# Patient Record
Sex: FEMALE | Race: WHITE | Employment: OTHER | URBAN - METROPOLITAN AREA
[De-identification: names, ages, dates, MRNs, and addresses within clinical notes are randomized per-mention and may not be internally consistent; named-entity substitution may affect disease eponyms.]

---

## 2017-01-16 ENCOUNTER — HOSPITAL ENCOUNTER (OUTPATIENT)
Dept: CT IMAGING | Age: 82
Discharge: HOME | End: 2017-01-16
Attending: INTERNAL MEDICINE
Payer: MEDICARE

## 2017-01-16 ENCOUNTER — LAB ENCOUNTER (OUTPATIENT)
Dept: LAB | Age: 82
End: 2017-01-16
Attending: INTERNAL MEDICINE
Payer: MEDICARE

## 2017-01-16 DIAGNOSIS — Z51.81 ENCOUNTER FOR THERAPEUTIC DRUG MONITORING: ICD-10-CM

## 2017-01-16 DIAGNOSIS — M05.79 RHEUMATOID ARTHRITIS INVOLVING MULTIPLE SITES WITH POSITIVE RHEUMATOID FACTOR (HCC): ICD-10-CM

## 2017-01-16 DIAGNOSIS — R91.1 NODULE OF LEFT LUNG: ICD-10-CM

## 2017-01-16 DIAGNOSIS — M32.9 SYSTEMIC LUPUS ERYTHEMATOSUS, UNSPECIFIED SLE TYPE, UNSPECIFIED ORGAN INVOLVEMENT STATUS (HCC): ICD-10-CM

## 2017-01-16 LAB
ALBUMIN SERPL BCP-MCNC: 3.3 G/DL (ref 3.5–4.8)
AST SERPL-CCNC: 31 U/L (ref 15–41)
BASOPHILS # BLD: 0 K/UL (ref 0–0.2)
BASOPHILS NFR BLD: 1 %
BILIRUB UR QL: NEGATIVE
COLOR UR: YELLOW
CREAT SERPL-MCNC: 1.07 MG/DL (ref 0.5–1.5)
CRP SERPL-MCNC: 0.8 MG/DL (ref 0–0.9)
EOSINOPHIL # BLD: 0.1 K/UL (ref 0–0.7)
EOSINOPHIL NFR BLD: 2 %
ERYTHROCYTE [DISTWIDTH] IN BLOOD BY AUTOMATED COUNT: 15.7 % (ref 11–15)
ERYTHROCYTE [SEDIMENTATION RATE] IN BLOOD: 87 MM/HR (ref 0–30)
GLUCOSE UR-MCNC: NEGATIVE MG/DL
HCT VFR BLD AUTO: 32.5 % (ref 35–48)
HGB BLD-MCNC: 10.7 G/DL (ref 12–16)
HGB UR QL STRIP.AUTO: NEGATIVE
LYMPHOCYTES # BLD: 0.9 K/UL (ref 1–4)
LYMPHOCYTES NFR BLD: 18 %
MCH RBC QN AUTO: 30.5 PG (ref 27–32)
MCHC RBC AUTO-ENTMCNC: 33.1 G/DL (ref 32–37)
MCV RBC AUTO: 92 FL (ref 80–100)
MONOCYTES # BLD: 0.4 K/UL (ref 0–1)
MONOCYTES NFR BLD: 9 %
NEUTROPHILS # BLD AUTO: 3.3 K/UL (ref 1.8–7.7)
NEUTROPHILS NFR BLD: 71 %
NITRITE UR QL STRIP.AUTO: NEGATIVE
PH UR: 5 [PH] (ref 5–8)
PLATELET # BLD AUTO: 246 K/UL (ref 140–400)
PMV BLD AUTO: 9.5 FL (ref 7.4–10.3)
PROT UR-MCNC: 30 MG/DL
RBC # BLD AUTO: 3.53 M/UL (ref 3.7–5.4)
RBC #/AREA URNS AUTO: 1 /HPF
SP GR UR STRIP: 1.03 (ref 1–1.03)
UROBILINOGEN UR STRIP-ACNC: <2
VIT C UR-MCNC: 40 MG/DL
WBC # BLD AUTO: 4.6 K/UL (ref 4–11)
WBC #/AREA URNS AUTO: 11 /HPF

## 2017-01-16 PROCEDURE — 84450 TRANSFERASE (AST) (SGOT): CPT

## 2017-01-16 PROCEDURE — 86140 C-REACTIVE PROTEIN: CPT

## 2017-01-16 PROCEDURE — 81001 URINALYSIS AUTO W/SCOPE: CPT

## 2017-01-16 PROCEDURE — 71250 CT THORAX DX C-: CPT

## 2017-01-16 PROCEDURE — 82040 ASSAY OF SERUM ALBUMIN: CPT

## 2017-01-16 PROCEDURE — 82565 ASSAY OF CREATININE: CPT

## 2017-01-16 PROCEDURE — 85025 COMPLETE CBC W/AUTO DIFF WBC: CPT

## 2017-01-16 PROCEDURE — 36415 COLL VENOUS BLD VENIPUNCTURE: CPT

## 2017-01-16 PROCEDURE — 85652 RBC SED RATE AUTOMATED: CPT

## 2017-01-25 ENCOUNTER — OFFICE VISIT (OUTPATIENT)
Dept: RHEUMATOLOGY | Facility: CLINIC | Age: 82
End: 2017-01-25

## 2017-01-25 VITALS
WEIGHT: 108.81 LBS | SYSTOLIC BLOOD PRESSURE: 125 MMHG | DIASTOLIC BLOOD PRESSURE: 71 MMHG | HEIGHT: 64.5 IN | BODY MASS INDEX: 18.35 KG/M2 | HEART RATE: 79 BPM

## 2017-01-25 DIAGNOSIS — R91.1 PULMONARY NODULE: ICD-10-CM

## 2017-01-25 DIAGNOSIS — Z51.81 ENCOUNTER FOR THERAPEUTIC DRUG MONITORING: ICD-10-CM

## 2017-01-25 DIAGNOSIS — M05.79 RHEUMATOID ARTHRITIS INVOLVING MULTIPLE SITES WITH POSITIVE RHEUMATOID FACTOR (HCC): Primary | ICD-10-CM

## 2017-01-25 DIAGNOSIS — K52.9 ENTERITIS: ICD-10-CM

## 2017-01-25 PROCEDURE — G0463 HOSPITAL OUTPT CLINIC VISIT: HCPCS | Performed by: INTERNAL MEDICINE

## 2017-01-25 PROCEDURE — 99214 OFFICE O/P EST MOD 30 MIN: CPT | Performed by: INTERNAL MEDICINE

## 2017-01-25 RX ORDER — METHOTREXATE 2.5 MG/1
TABLET ORAL
Qty: 30 TABLET | Refills: 2 | Status: SHIPPED | OUTPATIENT
Start: 2017-01-25 | End: 2017-08-28

## 2017-01-25 RX ORDER — BISMUTH SUBCITRATE POTASSIUM, METRONIDAZOLE, TETRACYCLINE HYDROCHLORIDE 140; 125; 125 MG/1; MG/1; MG/1
3 CAPSULE ORAL 4 TIMES DAILY
Refills: 0 | COMMUNITY
Start: 2017-01-20 | End: 2017-05-23

## 2017-01-25 NOTE — PROGRESS NOTES
HPI:    Patient ID: Tayler Rocha is a [de-identified]year old female. 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   She started prednisone 30 mg a day stiffness. Review of Systems   Constitutional: Positive for fatigue. Negative for fever, chills and diaphoresis. Respiratory: Negative for shortness of breath. Cardiovascular: Negative for chest pain.    Gastrointestinal: Positive for abdominal pa history of cigarette smoking many years ago. Dr. Sarai Luong may well want to refer her to pulmonary. Follow-up CT is recommended in 3 months. 3. Renal insufficiency. Chronic. 4. Anemia.  Some could be related to her renal insufficiency, and much is like

## 2017-02-24 ENCOUNTER — LAB ENCOUNTER (OUTPATIENT)
Dept: LAB | Age: 82
End: 2017-02-24
Attending: INTERNAL MEDICINE
Payer: MEDICARE

## 2017-02-24 DIAGNOSIS — Z51.81 ENCOUNTER FOR THERAPEUTIC DRUG MONITORING: ICD-10-CM

## 2017-02-24 DIAGNOSIS — M05.79 RHEUMATOID ARTHRITIS INVOLVING MULTIPLE SITES WITH POSITIVE RHEUMATOID FACTOR (HCC): ICD-10-CM

## 2017-02-24 LAB
ALBUMIN SERPL BCP-MCNC: 3.5 G/DL (ref 3.5–4.8)
AST SERPL-CCNC: 30 U/L (ref 15–41)
BASOPHILS # BLD: 0 K/UL (ref 0–0.2)
BASOPHILS NFR BLD: 0 %
CREAT SERPL-MCNC: 1.06 MG/DL (ref 0.5–1.5)
CRP SERPL-MCNC: 0.6 MG/DL (ref 0–0.9)
EOSINOPHIL # BLD: 0 K/UL (ref 0–0.7)
EOSINOPHIL NFR BLD: 0 %
ERYTHROCYTE [DISTWIDTH] IN BLOOD BY AUTOMATED COUNT: 18.3 % (ref 11–15)
ERYTHROCYTE [SEDIMENTATION RATE] IN BLOOD: 54 MM/HR (ref 0–30)
HCT VFR BLD AUTO: 33.1 % (ref 35–48)
HGB BLD-MCNC: 11 G/DL (ref 12–16)
LYMPHOCYTES # BLD: 0.8 K/UL (ref 1–4)
LYMPHOCYTES NFR BLD: 11 %
MCH RBC QN AUTO: 32 PG (ref 27–32)
MCHC RBC AUTO-ENTMCNC: 33.2 G/DL (ref 32–37)
MCV RBC AUTO: 96.4 FL (ref 80–100)
MONOCYTES # BLD: 0.3 K/UL (ref 0–1)
MONOCYTES NFR BLD: 5 %
NEUTROPHILS # BLD AUTO: 6 K/UL (ref 1.8–7.7)
NEUTROPHILS NFR BLD: 84 %
PLATELET # BLD AUTO: 232 K/UL (ref 140–400)
PMV BLD AUTO: 9.9 FL (ref 7.4–10.3)
RBC # BLD AUTO: 3.44 M/UL (ref 3.7–5.4)
WBC # BLD AUTO: 7.2 K/UL (ref 4–11)

## 2017-02-24 PROCEDURE — 86140 C-REACTIVE PROTEIN: CPT

## 2017-02-24 PROCEDURE — 85025 COMPLETE CBC W/AUTO DIFF WBC: CPT

## 2017-02-24 PROCEDURE — 84450 TRANSFERASE (AST) (SGOT): CPT

## 2017-02-24 PROCEDURE — 36415 COLL VENOUS BLD VENIPUNCTURE: CPT

## 2017-02-24 PROCEDURE — 85652 RBC SED RATE AUTOMATED: CPT

## 2017-02-24 PROCEDURE — 82565 ASSAY OF CREATININE: CPT

## 2017-02-24 PROCEDURE — 82040 ASSAY OF SERUM ALBUMIN: CPT

## 2017-03-01 ENCOUNTER — OFFICE VISIT (OUTPATIENT)
Dept: RHEUMATOLOGY | Facility: CLINIC | Age: 82
End: 2017-03-01

## 2017-03-01 VITALS
HEART RATE: 83 BPM | BODY MASS INDEX: 17.77 KG/M2 | DIASTOLIC BLOOD PRESSURE: 71 MMHG | SYSTOLIC BLOOD PRESSURE: 134 MMHG | WEIGHT: 105.38 LBS | HEIGHT: 64.5 IN

## 2017-03-01 DIAGNOSIS — Z51.81 ENCOUNTER FOR THERAPEUTIC DRUG MONITORING: ICD-10-CM

## 2017-03-01 DIAGNOSIS — K52.9 ENTERITIS: ICD-10-CM

## 2017-03-01 DIAGNOSIS — R91.1 PULMONARY NODULE: ICD-10-CM

## 2017-03-01 DIAGNOSIS — M05.79 RHEUMATOID ARTHRITIS INVOLVING MULTIPLE SITES WITH POSITIVE RHEUMATOID FACTOR (HCC): Primary | ICD-10-CM

## 2017-03-01 PROCEDURE — 99214 OFFICE O/P EST MOD 30 MIN: CPT | Performed by: INTERNAL MEDICINE

## 2017-03-01 PROCEDURE — G0463 HOSPITAL OUTPT CLINIC VISIT: HCPCS | Performed by: INTERNAL MEDICINE

## 2017-03-01 NOTE — PROGRESS NOTES
HPI:    Patient ID: León Cruz is a 81year old female. 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   She started prednisone 30 mg a day a in the distal esophagus. She saw Dr. Conchita Machuca, who will follow this up. Review of Systems   Constitutional: Positive for fatigue. Negative for fever, chills and diaphoresis. Respiratory: Negative for shortness of breath.     Cardiovascular: Nega thickening. She has a history of cigarette smoking many years ago. 3. Renal insufficiency. Chronic. Stable. 4. Anemia. Some could be related to her renal insufficiency. Improving as her RA comes under better control. 5. H Pylori.  Continued stomach sympt

## 2017-05-03 NOTE — TELEPHONE ENCOUNTER
LOV:3-1  Last Filled:  Labs:12-13, #90 with 0 refill  Future Appointments  Date Time Provider Mariah Rivera   5/23/2017 9:40 AM Lela Rick MD [facility] [place] [facility] [place]       Please Advise

## 2017-05-04 RX ORDER — PREDNISONE 1 MG/1
TABLET ORAL
Qty: 90 TABLET | Refills: 0 | OUTPATIENT
Start: 2017-05-04

## 2017-05-18 ENCOUNTER — APPOINTMENT (OUTPATIENT)
Dept: LAB | Age: 82
End: 2017-05-18
Attending: INTERNAL MEDICINE
Payer: MEDICARE

## 2017-05-19 ENCOUNTER — LAB ENCOUNTER (OUTPATIENT)
Dept: LAB | Age: 82
End: 2017-05-19
Attending: FAMILY MEDICINE
Payer: MEDICARE

## 2017-05-19 DIAGNOSIS — Z51.81 ENCOUNTER FOR THERAPEUTIC DRUG MONITORING: ICD-10-CM

## 2017-05-19 DIAGNOSIS — E78.5 HYPERLIPEMIA: Primary | ICD-10-CM

## 2017-05-19 DIAGNOSIS — M05.79 RHEUMATOID ARTHRITIS INVOLVING MULTIPLE SITES WITH POSITIVE RHEUMATOID FACTOR (HCC): ICD-10-CM

## 2017-05-19 PROCEDURE — 85652 RBC SED RATE AUTOMATED: CPT

## 2017-05-19 PROCEDURE — 80061 LIPID PANEL: CPT

## 2017-05-19 PROCEDURE — 80053 COMPREHEN METABOLIC PANEL: CPT

## 2017-05-19 PROCEDURE — 86140 C-REACTIVE PROTEIN: CPT

## 2017-05-19 PROCEDURE — 85025 COMPLETE CBC W/AUTO DIFF WBC: CPT

## 2017-05-19 PROCEDURE — 36415 COLL VENOUS BLD VENIPUNCTURE: CPT

## 2017-05-23 ENCOUNTER — OFFICE VISIT (OUTPATIENT)
Dept: RHEUMATOLOGY | Facility: CLINIC | Age: 82
End: 2017-05-23

## 2017-05-23 VITALS
WEIGHT: 107 LBS | SYSTOLIC BLOOD PRESSURE: 122 MMHG | HEIGHT: 64.5 IN | DIASTOLIC BLOOD PRESSURE: 69 MMHG | HEART RATE: 81 BPM | BODY MASS INDEX: 18.04 KG/M2

## 2017-05-23 DIAGNOSIS — Z51.81 ENCOUNTER FOR THERAPEUTIC DRUG MONITORING: ICD-10-CM

## 2017-05-23 DIAGNOSIS — M05.79 RHEUMATOID ARTHRITIS INVOLVING MULTIPLE SITES WITH POSITIVE RHEUMATOID FACTOR (HCC): Primary | ICD-10-CM

## 2017-05-23 PROCEDURE — G0463 HOSPITAL OUTPT CLINIC VISIT: HCPCS | Performed by: INTERNAL MEDICINE

## 2017-05-23 PROCEDURE — 99214 OFFICE O/P EST MOD 30 MIN: CPT | Performed by: INTERNAL MEDICINE

## 2017-05-23 RX ORDER — FOLIC ACID 1 MG/1
TABLET ORAL
Qty: 180 TABLET | Refills: 3 | Status: SHIPPED | OUTPATIENT
Start: 2017-05-23 | End: 2018-03-28

## 2017-05-23 NOTE — PROGRESS NOTES
HPI:    Patient ID: Amadou Mckenzie is a 81year old female. 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   She started prednisone 30 mg a day and t aspect of the superior segments of the lower lobes bilaterally. Infiltration of the anterior superior mediastinum. Review of Systems   Constitutional: Positive for fatigue. Negative for fever, chills and diaphoresis.    Respiratory: Negative f repeat monitoring tests. 2. Small pulmonary nodules, pleural thickening. She has a history of cigarette smoking many years ago. 3. Renal insufficiency. Chronic. Stable. 4. Anemia. Some could be related to her renal insufficiency. 5. H Pylori.  Continued

## 2017-07-07 ENCOUNTER — LAB ENCOUNTER (OUTPATIENT)
Dept: LAB | Age: 82
End: 2017-07-07
Attending: INTERNAL MEDICINE
Payer: MEDICARE

## 2017-07-07 DIAGNOSIS — Z51.81 ENCOUNTER FOR THERAPEUTIC DRUG MONITORING: ICD-10-CM

## 2017-07-07 DIAGNOSIS — M05.79 RHEUMATOID ARTHRITIS INVOLVING MULTIPLE SITES WITH POSITIVE RHEUMATOID FACTOR (HCC): ICD-10-CM

## 2017-07-07 LAB
ALBUMIN SERPL BCP-MCNC: 3.4 G/DL (ref 3.5–4.8)
AST SERPL-CCNC: 93 U/L (ref 15–41)
BASOPHILS # BLD: 0 K/UL (ref 0–0.2)
BASOPHILS NFR BLD: 0 %
CREAT SERPL-MCNC: 1.05 MG/DL (ref 0.5–1.5)
CRP SERPL-MCNC: 0.6 MG/DL (ref 0–0.9)
EOSINOPHIL # BLD: 0.1 K/UL (ref 0–0.7)
EOSINOPHIL NFR BLD: 3 %
ERYTHROCYTE [DISTWIDTH] IN BLOOD BY AUTOMATED COUNT: 15.4 % (ref 11–15)
ERYTHROCYTE [SEDIMENTATION RATE] IN BLOOD: 64 MM/HR (ref 0–30)
HCT VFR BLD AUTO: 29.7 % (ref 35–48)
HGB BLD-MCNC: 10.2 G/DL (ref 12–16)
LYMPHOCYTES # BLD: 0.7 K/UL (ref 1–4)
LYMPHOCYTES NFR BLD: 19 %
MCH RBC QN AUTO: 35.3 PG (ref 27–32)
MCHC RBC AUTO-ENTMCNC: 34.4 G/DL (ref 32–37)
MCV RBC AUTO: 102.6 FL (ref 80–100)
MONOCYTES # BLD: 0.1 K/UL (ref 0–1)
MONOCYTES NFR BLD: 4 %
NEUTROPHILS # BLD AUTO: 2.7 K/UL (ref 1.8–7.7)
NEUTROPHILS NFR BLD: 74 %
PLATELET # BLD AUTO: 171 K/UL (ref 140–400)
PMV BLD AUTO: 9.6 FL (ref 7.4–10.3)
RBC # BLD AUTO: 2.89 M/UL (ref 3.7–5.4)
WBC # BLD AUTO: 3.7 K/UL (ref 4–11)

## 2017-07-07 PROCEDURE — 85025 COMPLETE CBC W/AUTO DIFF WBC: CPT

## 2017-07-07 PROCEDURE — 36415 COLL VENOUS BLD VENIPUNCTURE: CPT

## 2017-07-07 PROCEDURE — 84450 TRANSFERASE (AST) (SGOT): CPT

## 2017-07-07 PROCEDURE — 86140 C-REACTIVE PROTEIN: CPT

## 2017-07-07 PROCEDURE — 82040 ASSAY OF SERUM ALBUMIN: CPT

## 2017-07-07 PROCEDURE — 85652 RBC SED RATE AUTOMATED: CPT

## 2017-07-07 PROCEDURE — 82565 ASSAY OF CREATININE: CPT

## 2017-07-11 ENCOUNTER — OFFICE VISIT (OUTPATIENT)
Dept: RHEUMATOLOGY | Facility: CLINIC | Age: 82
End: 2017-07-11

## 2017-07-11 VITALS
DIASTOLIC BLOOD PRESSURE: 61 MMHG | BODY MASS INDEX: 17.94 KG/M2 | HEIGHT: 64.5 IN | HEART RATE: 82 BPM | WEIGHT: 106.38 LBS | SYSTOLIC BLOOD PRESSURE: 106 MMHG

## 2017-07-11 DIAGNOSIS — Z51.81 ENCOUNTER FOR THERAPEUTIC DRUG MONITORING: ICD-10-CM

## 2017-07-11 DIAGNOSIS — R91.1 PULMONARY NODULE: ICD-10-CM

## 2017-07-11 DIAGNOSIS — M05.79 RHEUMATOID ARTHRITIS INVOLVING MULTIPLE SITES WITH POSITIVE RHEUMATOID FACTOR (HCC): Primary | ICD-10-CM

## 2017-07-11 DIAGNOSIS — R74.01 TRANSAMINASEMIA: ICD-10-CM

## 2017-07-11 PROCEDURE — G0463 HOSPITAL OUTPT CLINIC VISIT: HCPCS | Performed by: INTERNAL MEDICINE

## 2017-07-11 PROCEDURE — 99214 OFFICE O/P EST MOD 30 MIN: CPT | Performed by: INTERNAL MEDICINE

## 2017-07-11 NOTE — PROGRESS NOTES
HPI:    Patient ID: Yamile Ortiz is a 81year old female. 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   She started prednisone 30 mg a day and tapered to 5 aspect of the superior segments of the lower lobes bilaterally. Infiltration of the anterior superior mediastinum. She smoked more than 1 pack of day of cigarettes until giving him up 40 years ago.   She has chronic shortness of breath that comes and goes adenopathy. Neurological: She is alert and oriented to person, place, and time. Skin: No rash noted. Psychiatric: She has a normal mood and affect. ASSESSMENT/PLAN:     1. Severe CCP and RF positive rheumatoid arthritis.  She has improved

## 2017-08-28 NOTE — TELEPHONE ENCOUNTER
LOV: 7-11-17  Last Refilled:#30, 2rfs 1/25/17  Labs:AST 93 7/7/17      No future appointments. Please advise.

## 2017-09-25 ENCOUNTER — LAB ENCOUNTER (OUTPATIENT)
Dept: LAB | Age: 82
End: 2017-09-25
Attending: INTERNAL MEDICINE
Payer: MEDICARE

## 2017-09-25 DIAGNOSIS — Z51.81 ENCOUNTER FOR THERAPEUTIC DRUG MONITORING: ICD-10-CM

## 2017-09-25 DIAGNOSIS — M05.79 RHEUMATOID ARTHRITIS INVOLVING MULTIPLE SITES WITH POSITIVE RHEUMATOID FACTOR (HCC): ICD-10-CM

## 2017-09-25 LAB
ALBUMIN SERPL BCP-MCNC: 3.3 G/DL (ref 3.5–4.8)
AST SERPL-CCNC: 35 U/L (ref 15–41)
BASOPHILS # BLD: 0 K/UL (ref 0–0.2)
BASOPHILS NFR BLD: 1 %
CREAT SERPL-MCNC: 1.06 MG/DL (ref 0.5–1.5)
CRP SERPL-MCNC: 1 MG/DL (ref 0–0.9)
EOSINOPHIL # BLD: 0.1 K/UL (ref 0–0.7)
EOSINOPHIL NFR BLD: 2 %
ERYTHROCYTE [DISTWIDTH] IN BLOOD BY AUTOMATED COUNT: 14.5 % (ref 11–15)
ERYTHROCYTE [SEDIMENTATION RATE] IN BLOOD: 65 MM/HR (ref 0–30)
HCT VFR BLD AUTO: 29.6 % (ref 35–48)
HGB BLD-MCNC: 10.1 G/DL (ref 12–16)
LYMPHOCYTES # BLD: 0.6 K/UL (ref 1–4)
LYMPHOCYTES NFR BLD: 17 %
MCH RBC QN AUTO: 35.2 PG (ref 27–32)
MCHC RBC AUTO-ENTMCNC: 34.3 G/DL (ref 32–37)
MCV RBC AUTO: 102.7 FL (ref 80–100)
MONOCYTES # BLD: 0.4 K/UL (ref 0–1)
MONOCYTES NFR BLD: 11 %
NEUTROPHILS # BLD AUTO: 2.6 K/UL (ref 1.8–7.7)
NEUTROPHILS NFR BLD: 69 %
PLATELET # BLD AUTO: 170 K/UL (ref 140–400)
PMV BLD AUTO: 9.2 FL (ref 7.4–10.3)
RBC # BLD AUTO: 2.88 M/UL (ref 3.7–5.4)
WBC # BLD AUTO: 3.7 K/UL (ref 4–11)

## 2017-09-25 PROCEDURE — 82040 ASSAY OF SERUM ALBUMIN: CPT

## 2017-09-25 PROCEDURE — 36415 COLL VENOUS BLD VENIPUNCTURE: CPT

## 2017-09-25 PROCEDURE — 85652 RBC SED RATE AUTOMATED: CPT

## 2017-09-25 PROCEDURE — 82565 ASSAY OF CREATININE: CPT

## 2017-09-25 PROCEDURE — 84450 TRANSFERASE (AST) (SGOT): CPT

## 2017-09-25 PROCEDURE — 86140 C-REACTIVE PROTEIN: CPT

## 2017-09-25 PROCEDURE — 85025 COMPLETE CBC W/AUTO DIFF WBC: CPT

## 2017-09-26 NOTE — PROGRESS NOTES
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 She started prednisone 30 mg a day and tapered to 5 mg a day, which really helped originally.  She stopped prednisone the superior segments of the lower lobes bilaterally. Infiltration of the anterior superior mediastinum. She smoked more than 1 pack of day of cigarettes until giving him up 40 years ago.   She has chronic shortness of breath that comes and goes over the adenopathy. Neurological: She is alert and oriented to person, place, and time. Skin: No rash noted. Psychiatric: She has a normal mood and affect. ASSESSMENT/PLAN:     1. Severe CCP and RF positive rheumatoid arthritis.  She has improved

## 2017-09-27 ENCOUNTER — OFFICE VISIT (OUTPATIENT)
Dept: RHEUMATOLOGY | Facility: CLINIC | Age: 82
End: 2017-09-27

## 2017-09-27 VITALS
SYSTOLIC BLOOD PRESSURE: 112 MMHG | DIASTOLIC BLOOD PRESSURE: 64 MMHG | HEART RATE: 81 BPM | HEIGHT: 64 IN | BODY MASS INDEX: 18.75 KG/M2 | WEIGHT: 109.81 LBS

## 2017-09-27 DIAGNOSIS — M05.79 RHEUMATOID ARTHRITIS INVOLVING MULTIPLE SITES WITH POSITIVE RHEUMATOID FACTOR (HCC): Primary | ICD-10-CM

## 2017-09-27 DIAGNOSIS — R74.01 TRANSAMINASEMIA: ICD-10-CM

## 2017-09-27 DIAGNOSIS — R91.1 PULMONARY NODULE: ICD-10-CM

## 2017-09-27 DIAGNOSIS — Z51.81 ENCOUNTER FOR THERAPEUTIC DRUG MONITORING: ICD-10-CM

## 2017-09-27 PROCEDURE — G0463 HOSPITAL OUTPT CLINIC VISIT: HCPCS | Performed by: INTERNAL MEDICINE

## 2017-09-27 PROCEDURE — 99214 OFFICE O/P EST MOD 30 MIN: CPT | Performed by: INTERNAL MEDICINE

## 2017-12-22 ENCOUNTER — LAB ENCOUNTER (OUTPATIENT)
Dept: LAB | Age: 82
End: 2017-12-22
Attending: INTERNAL MEDICINE
Payer: MEDICARE

## 2017-12-22 DIAGNOSIS — M05.79 RHEUMATOID ARTHRITIS INVOLVING MULTIPLE SITES WITH POSITIVE RHEUMATOID FACTOR (HCC): ICD-10-CM

## 2017-12-22 DIAGNOSIS — Z51.81 ENCOUNTER FOR THERAPEUTIC DRUG MONITORING: ICD-10-CM

## 2017-12-22 PROCEDURE — 82565 ASSAY OF CREATININE: CPT

## 2017-12-22 PROCEDURE — 36415 COLL VENOUS BLD VENIPUNCTURE: CPT

## 2017-12-22 PROCEDURE — 82040 ASSAY OF SERUM ALBUMIN: CPT

## 2017-12-22 PROCEDURE — 85025 COMPLETE CBC W/AUTO DIFF WBC: CPT

## 2017-12-22 PROCEDURE — 85652 RBC SED RATE AUTOMATED: CPT

## 2017-12-22 PROCEDURE — 84450 TRANSFERASE (AST) (SGOT): CPT

## 2017-12-22 PROCEDURE — 86140 C-REACTIVE PROTEIN: CPT

## 2017-12-23 NOTE — PROGRESS NOTES
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 She started prednisone 30 mg a day and tapered to 5 mg a day, which really helped originally.  She stopped predn scarring which accounted for the density seen on chest x-ray. Small semisolid nodular density versus nodular atelectasis/scarring at the right medial base.   6 x 5 mm subpleural noncalcified nodular density right lower lobe, 3 mm noncalcified nodule left u Conjunctivae are normal.   Cardiovascular: Normal rate, regular rhythm and normal heart sounds. Pulmonary/Chest: Effort normal and breath sounds normal.   Abdominal: Soft. Bowel sounds are normal. She exhibits no mass. There is tenderness.  There is no r remain on the lower dose of methotrexate.

## 2017-12-27 ENCOUNTER — OFFICE VISIT (OUTPATIENT)
Dept: RHEUMATOLOGY | Facility: CLINIC | Age: 82
End: 2017-12-27

## 2017-12-27 VITALS
HEART RATE: 70 BPM | HEIGHT: 64 IN | SYSTOLIC BLOOD PRESSURE: 148 MMHG | BODY MASS INDEX: 20.32 KG/M2 | WEIGHT: 119 LBS | DIASTOLIC BLOOD PRESSURE: 73 MMHG

## 2017-12-27 DIAGNOSIS — R74.01 TRANSAMINASEMIA: ICD-10-CM

## 2017-12-27 DIAGNOSIS — M05.79 RHEUMATOID ARTHRITIS INVOLVING MULTIPLE SITES WITH POSITIVE RHEUMATOID FACTOR (HCC): Primary | ICD-10-CM

## 2017-12-27 DIAGNOSIS — Z51.81 ENCOUNTER FOR THERAPEUTIC DRUG MONITORING: ICD-10-CM

## 2017-12-27 DIAGNOSIS — D64.9 ANEMIA, UNSPECIFIED TYPE: ICD-10-CM

## 2017-12-27 DIAGNOSIS — R91.1 PULMONARY NODULE: ICD-10-CM

## 2017-12-27 PROCEDURE — G0463 HOSPITAL OUTPT CLINIC VISIT: HCPCS | Performed by: INTERNAL MEDICINE

## 2017-12-27 PROCEDURE — 99214 OFFICE O/P EST MOD 30 MIN: CPT | Performed by: INTERNAL MEDICINE

## 2018-03-02 ENCOUNTER — HOSPITAL (OUTPATIENT)
Dept: OTHER | Age: 83
End: 2018-03-02
Attending: EMERGENCY MEDICINE

## 2018-03-02 LAB
ANALYZER ANC (IANC): ABNORMAL
ANION GAP SERPL CALC-SCNC: 10 MMOL/L (ref 10–20)
BASOPHILS # BLD: 0 THOUSAND/MCL (ref 0–0.3)
BASOPHILS NFR BLD: 0 %
BUN SERPL-MCNC: 35 MG/DL (ref 6–20)
BUN/CREAT SERPL: 31 (ref 7–25)
CALCIUM SERPL-MCNC: 8.7 MG/DL (ref 8.4–10.2)
CHLORIDE: 102 MMOL/L (ref 98–107)
CO2 SERPL-SCNC: 28 MMOL/L (ref 21–32)
CREAT SERPL-MCNC: 1.12 MG/DL (ref 0.51–0.95)
DIFFERENTIAL METHOD BLD: ABNORMAL
EOSINOPHIL # BLD: 0.1 THOUSAND/MCL (ref 0.1–0.5)
EOSINOPHIL NFR BLD: 1 %
ERYTHROCYTE [DISTWIDTH] IN BLOOD: 14.1 % (ref 11–15)
GLUCOSE SERPL-MCNC: 88 MG/DL (ref 65–99)
HEMATOCRIT: 31.8 % (ref 36–46.5)
HGB BLD-MCNC: 10.5 GM/DL (ref 12–15.5)
LYMPHOCYTES # BLD: 2.1 THOUSAND/MCL (ref 1–4)
LYMPHOCYTES NFR BLD: 16 %
MCH RBC QN AUTO: 33.2 PG (ref 26–34)
MCHC RBC AUTO-ENTMCNC: 33 GM/DL (ref 32–36.5)
MCV RBC AUTO: 100.6 FL (ref 78–100)
MONOCYTES # BLD: 0.7 THOUSAND/MCL (ref 0.3–0.9)
MONOCYTES NFR BLD: 5 %
NEUTROPHILS # BLD: 10.4 THOUSAND/MCL (ref 1.8–7.7)
NEUTROPHILS NFR BLD: 78 %
NEUTS SEG NFR BLD: ABNORMAL %
PERCENT NRBC: ABNORMAL
PLATELET # BLD: 450 THOUSAND/MCL (ref 140–450)
POTASSIUM SERPL-SCNC: 4.9 MMOL/L (ref 3.4–5.1)
RBC # BLD: 3.16 MILLION/MCL (ref 4–5.2)
SODIUM SERPL-SCNC: 135 MMOL/L (ref 135–145)
WBC # BLD: 13.3 THOUSAND/MCL (ref 4.2–11)

## 2018-03-26 ENCOUNTER — LAB ENCOUNTER (OUTPATIENT)
Dept: LAB | Age: 83
End: 2018-03-26
Attending: INTERNAL MEDICINE
Payer: MEDICARE

## 2018-03-26 DIAGNOSIS — Z51.81 ENCOUNTER FOR THERAPEUTIC DRUG MONITORING: ICD-10-CM

## 2018-03-26 DIAGNOSIS — M05.79 RHEUMATOID ARTHRITIS INVOLVING MULTIPLE SITES WITH POSITIVE RHEUMATOID FACTOR (HCC): ICD-10-CM

## 2018-03-26 LAB
ALBUMIN SERPL BCP-MCNC: 2.9 G/DL (ref 3.5–4.8)
AST SERPL-CCNC: 31 U/L (ref 15–41)
BASOPHILS # BLD: 0 K/UL (ref 0–0.2)
BASOPHILS NFR BLD: 1 %
CREAT SERPL-MCNC: 1 MG/DL (ref 0.5–1.5)
CRP SERPL-MCNC: 1.4 MG/DL (ref 0–0.9)
EOSINOPHIL # BLD: 0 K/UL (ref 0–0.7)
EOSINOPHIL NFR BLD: 1 %
ERYTHROCYTE [DISTWIDTH] IN BLOOD BY AUTOMATED COUNT: 15.8 % (ref 11–15)
ERYTHROCYTE [SEDIMENTATION RATE] IN BLOOD: 59 MM/HR (ref 0–30)
HCT VFR BLD AUTO: 29.9 % (ref 35–48)
HGB BLD-MCNC: 10.2 G/DL (ref 12–16)
LYMPHOCYTES # BLD: 0.7 K/UL (ref 1–4)
LYMPHOCYTES NFR BLD: 18 %
MCH RBC QN AUTO: 34.2 PG (ref 27–32)
MCHC RBC AUTO-ENTMCNC: 34 G/DL (ref 32–37)
MCV RBC AUTO: 100.6 FL (ref 80–100)
MONOCYTES # BLD: 0.4 K/UL (ref 0–1)
MONOCYTES NFR BLD: 10 %
NEUTROPHILS # BLD AUTO: 2.9 K/UL (ref 1.8–7.7)
NEUTROPHILS NFR BLD: 71 %
PLATELET # BLD AUTO: 204 K/UL (ref 140–400)
PMV BLD AUTO: 8.7 FL (ref 7.4–10.3)
RBC # BLD AUTO: 2.97 M/UL (ref 3.7–5.4)
WBC # BLD AUTO: 4 K/UL (ref 4–11)

## 2018-03-26 PROCEDURE — 86140 C-REACTIVE PROTEIN: CPT

## 2018-03-26 PROCEDURE — 85652 RBC SED RATE AUTOMATED: CPT

## 2018-03-26 PROCEDURE — 82040 ASSAY OF SERUM ALBUMIN: CPT

## 2018-03-26 PROCEDURE — 36415 COLL VENOUS BLD VENIPUNCTURE: CPT

## 2018-03-26 PROCEDURE — 82565 ASSAY OF CREATININE: CPT

## 2018-03-26 PROCEDURE — 85025 COMPLETE CBC W/AUTO DIFF WBC: CPT

## 2018-03-26 PROCEDURE — 84450 TRANSFERASE (AST) (SGOT): CPT

## 2018-03-27 NOTE — PROGRESS NOTES
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 At first she was on prednisone which helped, but she stopped it on her own, and doesn't want to start it back.  Kaelyn Suarez Small semisolid nodular density versus nodular atelectasis/scarring at the right medial base. 6 x 5 mm subpleural noncalcified nodular density right lower lobe, 3 mm noncalcified nodule left upper lobe.   Additional nodularity and pleural thickening poster Abdominal: Soft. Bowel sounds are normal. She exhibits no mass. There is tenderness. There is no rebound and no guarding. Musculoskeletal: She exhibits no edema. There may be minimal soft tissue swelling in her wrists, hand MCP joints.   There is syno on the lower dose of methotrexate.

## 2018-03-28 ENCOUNTER — OFFICE VISIT (OUTPATIENT)
Dept: RHEUMATOLOGY | Facility: CLINIC | Age: 83
End: 2018-03-28

## 2018-03-28 VITALS
HEART RATE: 96 BPM | DIASTOLIC BLOOD PRESSURE: 65 MMHG | HEIGHT: 64 IN | WEIGHT: 104 LBS | SYSTOLIC BLOOD PRESSURE: 109 MMHG | BODY MASS INDEX: 17.75 KG/M2

## 2018-03-28 DIAGNOSIS — Z51.81 ENCOUNTER FOR THERAPEUTIC DRUG MONITORING: ICD-10-CM

## 2018-03-28 DIAGNOSIS — M05.79 RHEUMATOID ARTHRITIS INVOLVING MULTIPLE SITES WITH POSITIVE RHEUMATOID FACTOR (HCC): Primary | ICD-10-CM

## 2018-03-28 DIAGNOSIS — D63.8 ANEMIA OF CHRONIC DISEASE: ICD-10-CM

## 2018-03-28 PROCEDURE — 99214 OFFICE O/P EST MOD 30 MIN: CPT | Performed by: INTERNAL MEDICINE

## 2018-03-28 PROCEDURE — G0463 HOSPITAL OUTPT CLINIC VISIT: HCPCS | Performed by: INTERNAL MEDICINE

## 2018-03-28 RX ORDER — LEFLUNOMIDE 20 MG/1
TABLET ORAL
Qty: 30 TABLET | Refills: 2 | Status: SHIPPED | OUTPATIENT
Start: 2018-03-28 | End: 2018-06-06

## 2018-03-28 RX ORDER — FLUTICASONE PROPIONATE AND SALMETEROL 50; 250 UG/1; UG/1
1 POWDER RESPIRATORY (INHALATION) 2 TIMES DAILY
Refills: 0 | COMMUNITY
Start: 2018-03-07 | End: 2019-04-24 | Stop reason: ALTCHOICE

## 2018-04-30 ENCOUNTER — LAB ENCOUNTER (OUTPATIENT)
Dept: LAB | Age: 83
End: 2018-04-30
Attending: INTERNAL MEDICINE
Payer: MEDICARE

## 2018-04-30 DIAGNOSIS — Z51.81 ENCOUNTER FOR THERAPEUTIC DRUG MONITORING: ICD-10-CM

## 2018-04-30 DIAGNOSIS — M05.79 RHEUMATOID ARTHRITIS INVOLVING MULTIPLE SITES WITH POSITIVE RHEUMATOID FACTOR (HCC): ICD-10-CM

## 2018-04-30 PROCEDURE — 82565 ASSAY OF CREATININE: CPT

## 2018-04-30 PROCEDURE — 84450 TRANSFERASE (AST) (SGOT): CPT

## 2018-04-30 PROCEDURE — 82040 ASSAY OF SERUM ALBUMIN: CPT

## 2018-04-30 PROCEDURE — 85652 RBC SED RATE AUTOMATED: CPT

## 2018-04-30 PROCEDURE — 36415 COLL VENOUS BLD VENIPUNCTURE: CPT

## 2018-04-30 PROCEDURE — 86140 C-REACTIVE PROTEIN: CPT

## 2018-04-30 PROCEDURE — 85025 COMPLETE CBC W/AUTO DIFF WBC: CPT

## 2018-05-01 NOTE — PROGRESS NOTES
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 At first she was on prednisone which helped, but she stopped it on her own, and doesn't want to start it back.  [facility] upper lobe. Additional nodularity and pleural thickening posterior aspect of the superior segments of the lower lobes bilaterally. Infiltration of the anterior superior mediastinum.   She smoked more than 1 pack of day of cigarettes until giving him up 40 MCP joints. There is synovitis of her right thumb MCP joint.  strength is good. Shoulders and hips move freely. There are 3 what appear to be rheumatoid nodules on her right thumb finger pad and 1 just distal to her right third finger PIP joint.   L

## 2018-05-02 ENCOUNTER — TELEPHONE (OUTPATIENT)
Dept: RHEUMATOLOGY | Facility: CLINIC | Age: 83
End: 2018-05-02

## 2018-05-02 ENCOUNTER — APPOINTMENT (OUTPATIENT)
Dept: LAB | Age: 83
End: 2018-05-02
Attending: INTERNAL MEDICINE
Payer: MEDICARE

## 2018-05-02 ENCOUNTER — OFFICE VISIT (OUTPATIENT)
Dept: RHEUMATOLOGY | Facility: CLINIC | Age: 83
End: 2018-05-02

## 2018-05-02 VITALS
HEART RATE: 98 BPM | DIASTOLIC BLOOD PRESSURE: 72 MMHG | SYSTOLIC BLOOD PRESSURE: 123 MMHG | WEIGHT: 102.81 LBS | BODY MASS INDEX: 17.55 KG/M2 | HEIGHT: 64 IN

## 2018-05-02 DIAGNOSIS — M05.79 RHEUMATOID ARTHRITIS INVOLVING MULTIPLE SITES WITH POSITIVE RHEUMATOID FACTOR (HCC): ICD-10-CM

## 2018-05-02 DIAGNOSIS — M05.79 RHEUMATOID ARTHRITIS INVOLVING MULTIPLE SITES WITH POSITIVE RHEUMATOID FACTOR (HCC): Primary | ICD-10-CM

## 2018-05-02 DIAGNOSIS — Z51.81 ENCOUNTER FOR THERAPEUTIC DRUG MONITORING: ICD-10-CM

## 2018-05-02 DIAGNOSIS — D63.8 ANEMIA OF CHRONIC DISEASE: ICD-10-CM

## 2018-05-02 PROCEDURE — 86704 HEP B CORE ANTIBODY TOTAL: CPT

## 2018-05-02 PROCEDURE — 86803 HEPATITIS C AB TEST: CPT

## 2018-05-02 PROCEDURE — 86706 HEP B SURFACE ANTIBODY: CPT

## 2018-05-02 PROCEDURE — 87340 HEPATITIS B SURFACE AG IA: CPT

## 2018-05-02 PROCEDURE — 36415 COLL VENOUS BLD VENIPUNCTURE: CPT

## 2018-05-02 PROCEDURE — 80500 HEPATITIS B PROFILE: CPT

## 2018-05-02 PROCEDURE — 99214 OFFICE O/P EST MOD 30 MIN: CPT | Performed by: INTERNAL MEDICINE

## 2018-05-02 PROCEDURE — 86480 TB TEST CELL IMMUN MEASURE: CPT

## 2018-05-02 PROCEDURE — G0463 HOSPITAL OUTPT CLINIC VISIT: HCPCS | Performed by: INTERNAL MEDICINE

## 2018-05-02 RX ORDER — FOLIC ACID 1 MG/1
1 TABLET ORAL DAILY
Refills: 3 | COMMUNITY
Start: 2018-03-29 | End: 2019-04-24

## 2018-05-06 ENCOUNTER — TELEPHONE (OUTPATIENT)
Dept: RHEUMATOLOGY | Facility: CLINIC | Age: 83
End: 2018-05-06

## 2018-05-06 NOTE — TELEPHONE ENCOUNTER
Please let her know that her TB, HepB and HepC tests are negative    As long as her f/u chest CT is OK, she is ready to start [place]. Thanks.

## 2018-05-08 NOTE — TELEPHONE ENCOUNTER
Pt aware of lab results. Pt uncertain of CT results. Office will call [address] for CT results.

## 2018-05-09 NOTE — TELEPHONE ENCOUNTER
I received a copy of Princess's chest CT scan that was done on April 16th, 2018 at [place] [id number], ordered by Dr. Katelynn Sellers, her pulmonologist.  She has areas of pulmonary consolidation predominantly in the upper and middle lobes, pleural-based densit

## 2018-05-14 ENCOUNTER — TELEPHONE (OUTPATIENT)
Dept: RHEUMATOLOGY | Facility: CLINIC | Age: 83
End: 2018-05-14

## 2018-05-14 NOTE — TELEPHONE ENCOUNTER
Left message for pt to call Miguel Ángel Blake at [phone] for deliver of 1 free month of Tina Dyer. Call office back with questions.

## 2018-05-14 NOTE — TELEPHONE ENCOUNTER
[address] calling to inform Dr. Lawyer Cisneros that they have been unable to contact pt to delivery medication.  Please advise

## 2018-05-15 ENCOUNTER — MED REC SCAN ONLY (OUTPATIENT)
Dept: RHEUMATOLOGY | Facility: CLINIC | Age: 83
End: 2018-05-15

## 2018-06-04 ENCOUNTER — LAB ENCOUNTER (OUTPATIENT)
Dept: LAB | Age: 83
End: 2018-06-04
Attending: INTERNAL MEDICINE
Payer: MEDICARE

## 2018-06-04 DIAGNOSIS — M05.79 RHEUMATOID ARTHRITIS INVOLVING MULTIPLE SITES WITH POSITIVE RHEUMATOID FACTOR (HCC): ICD-10-CM

## 2018-06-04 DIAGNOSIS — Z51.81 ENCOUNTER FOR THERAPEUTIC DRUG MONITORING: ICD-10-CM

## 2018-06-04 PROCEDURE — 82565 ASSAY OF CREATININE: CPT

## 2018-06-04 PROCEDURE — 84450 TRANSFERASE (AST) (SGOT): CPT

## 2018-06-04 PROCEDURE — 86140 C-REACTIVE PROTEIN: CPT

## 2018-06-04 PROCEDURE — 85652 RBC SED RATE AUTOMATED: CPT

## 2018-06-04 PROCEDURE — 85025 COMPLETE CBC W/AUTO DIFF WBC: CPT

## 2018-06-04 PROCEDURE — 36415 COLL VENOUS BLD VENIPUNCTURE: CPT

## 2018-06-04 PROCEDURE — 82040 ASSAY OF SERUM ALBUMIN: CPT

## 2018-06-05 NOTE — PROGRESS NOTES
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 At first, she was on prednisone which helped, but she stopped it on her own, and doesn't want to start it back.  Since next week. She has seen pulmonary. She smoked more than 1 pack of day of cigarettes until giving him up 40 years ago. She has chronic shortness of breath that comes and goes over the last year.  Lately she she to have more fatigue, with shortness of jose pad and 1 just distal to her right third finger PIP joint. Lymphadenopathy:     She has no cervical adenopathy. Neurological: She is alert and oriented to person, place, and time. Skin: No rash noted. Psychiatric: She has a normal mood and affect.

## 2018-06-06 ENCOUNTER — OFFICE VISIT (OUTPATIENT)
Dept: RHEUMATOLOGY | Facility: CLINIC | Age: 83
End: 2018-06-06

## 2018-06-06 VITALS
SYSTOLIC BLOOD PRESSURE: 134 MMHG | BODY MASS INDEX: 17.42 KG/M2 | WEIGHT: 102 LBS | HEIGHT: 64 IN | HEART RATE: 90 BPM | DIASTOLIC BLOOD PRESSURE: 72 MMHG

## 2018-06-06 DIAGNOSIS — R91.1 PULMONARY NODULE: ICD-10-CM

## 2018-06-06 DIAGNOSIS — Z51.81 ENCOUNTER FOR THERAPEUTIC DRUG MONITORING: ICD-10-CM

## 2018-06-06 DIAGNOSIS — D63.8 ANEMIA OF CHRONIC DISEASE: ICD-10-CM

## 2018-06-06 DIAGNOSIS — M05.79 RHEUMATOID ARTHRITIS INVOLVING MULTIPLE SITES WITH POSITIVE RHEUMATOID FACTOR (HCC): Primary | ICD-10-CM

## 2018-06-06 PROCEDURE — 99214 OFFICE O/P EST MOD 30 MIN: CPT | Performed by: INTERNAL MEDICINE

## 2018-06-06 PROCEDURE — G0463 HOSPITAL OUTPT CLINIC VISIT: HCPCS | Performed by: INTERNAL MEDICINE

## 2018-06-06 RX ORDER — LEVOCETIRIZINE DIHYDROCHLORIDE 5 MG/1
5 TABLET, FILM COATED ORAL DAILY
Refills: 0 | COMMUNITY
Start: 2018-05-18 | End: 2019-04-24 | Stop reason: ALTCHOICE

## 2018-06-06 RX ORDER — LEFLUNOMIDE 20 MG/1
TABLET ORAL
Qty: 30 TABLET | Refills: 2 | Status: SHIPPED | OUTPATIENT
Start: 2018-06-06 | End: 2019-04-24

## 2018-06-22 RX ORDER — FOLIC ACID 1 MG/1
TABLET ORAL
Qty: 180 TABLET | Refills: 3 | Status: SHIPPED | OUTPATIENT
Start: 2018-06-22 | End: 2019-06-14

## 2018-10-22 NOTE — PROGRESS NOTES
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 At first, she was on prednisone which helped, but she stopped it on her own, and doesn't want to start it back.  Since bilaterally. Borderline to mildly enlarged lymph nodes in the mediastinum. Thyroid nodules and calcifications. A PET scan was done months ago, and she states was negative for malignancy.  She smoked more than 1 pack of day of cigarettes until giving him Neurological: She is alert and oriented to person, place, and time. Skin: No rash noted. Psychiatric: She has a normal mood and affect. ASSESSMENT/PLAN:     1. Severe CCP and RF positive rheumatoid arthritis.   She stopped leflunomide 20 mg a day

## 2018-10-23 ENCOUNTER — TELEPHONE (OUTPATIENT)
Dept: RHEUMATOLOGY | Facility: CLINIC | Age: 83
End: 2018-10-23

## 2018-10-23 ENCOUNTER — LAB ENCOUNTER (OUTPATIENT)
Dept: LAB | Age: 83
End: 2018-10-23
Attending: INTERNAL MEDICINE
Payer: MEDICARE

## 2018-10-23 DIAGNOSIS — M05.79 RHEUMATOID ARTHRITIS INVOLVING MULTIPLE SITES WITH POSITIVE RHEUMATOID FACTOR (HCC): Primary | ICD-10-CM

## 2018-10-23 DIAGNOSIS — Z51.81 ENCOUNTER FOR THERAPEUTIC DRUG MONITORING: ICD-10-CM

## 2018-10-23 DIAGNOSIS — M05.79 RHEUMATOID ARTHRITIS INVOLVING MULTIPLE SITES WITH POSITIVE RHEUMATOID FACTOR (HCC): ICD-10-CM

## 2018-10-23 PROCEDURE — 85025 COMPLETE CBC W/AUTO DIFF WBC: CPT

## 2018-10-23 PROCEDURE — 86140 C-REACTIVE PROTEIN: CPT

## 2018-10-23 PROCEDURE — 84450 TRANSFERASE (AST) (SGOT): CPT

## 2018-10-23 PROCEDURE — 85652 RBC SED RATE AUTOMATED: CPT

## 2018-10-23 PROCEDURE — 36415 COLL VENOUS BLD VENIPUNCTURE: CPT

## 2018-10-23 PROCEDURE — 82040 ASSAY OF SERUM ALBUMIN: CPT

## 2018-10-23 PROCEDURE — 82565 ASSAY OF CREATININE: CPT

## 2018-10-24 ENCOUNTER — OFFICE VISIT (OUTPATIENT)
Dept: RHEUMATOLOGY | Facility: CLINIC | Age: 83
End: 2018-10-24
Payer: MEDICARE

## 2018-10-24 ENCOUNTER — TELEPHONE (OUTPATIENT)
Dept: RHEUMATOLOGY | Facility: CLINIC | Age: 83
End: 2018-10-24

## 2018-10-24 VITALS
HEIGHT: 64 IN | DIASTOLIC BLOOD PRESSURE: 69 MMHG | HEART RATE: 76 BPM | SYSTOLIC BLOOD PRESSURE: 164 MMHG | WEIGHT: 101.81 LBS | BODY MASS INDEX: 17.38 KG/M2

## 2018-10-24 DIAGNOSIS — R91.1 PULMONARY NODULE: ICD-10-CM

## 2018-10-24 DIAGNOSIS — Z51.81 ENCOUNTER FOR THERAPEUTIC DRUG MONITORING: ICD-10-CM

## 2018-10-24 DIAGNOSIS — D63.8 ANEMIA OF CHRONIC DISEASE: ICD-10-CM

## 2018-10-24 DIAGNOSIS — M05.79 RHEUMATOID ARTHRITIS INVOLVING MULTIPLE SITES WITH POSITIVE RHEUMATOID FACTOR (HCC): Primary | ICD-10-CM

## 2018-10-24 PROCEDURE — 99214 OFFICE O/P EST MOD 30 MIN: CPT | Performed by: INTERNAL MEDICINE

## 2018-10-24 PROCEDURE — G0463 HOSPITAL OUTPT CLINIC VISIT: HCPCS | Performed by: INTERNAL MEDICINE

## 2018-11-01 NOTE — TELEPHONE ENCOUNTER
Contacted [place] to f/u on BI. Benefits verification summary to be faxed to [facility] office.

## 2018-11-02 NOTE — TELEPHONE ENCOUNTER
Contacted insurance at [phone] transferred to [phone]. Insurance to fax office 2 forms to [phone].

## 2018-11-07 NOTE — TELEPHONE ENCOUNTER
PA denied for [email or web address] [place]. Pt has to try and fail both Humira and  Enbrel and 2 of the following medications Remicade, Rituxan, [place], or Kineret. Please advise.

## 2018-11-12 NOTE — TELEPHONE ENCOUNTER
I spoke to Janet Brooks. I have a copy of her CT report from October 24th, 2018.   She has persistent tree-in-bud nodular opacifications predominantly in the right middle lobe and lingula, and lesser extent in other lobes, with mild bronchiectasis and bronchiol

## 2018-11-15 ENCOUNTER — LAB ENCOUNTER (OUTPATIENT)
Dept: LAB | Age: 83
End: 2018-11-15
Attending: INTERNAL MEDICINE
Payer: MEDICARE

## 2018-11-15 DIAGNOSIS — R91.8 LUNG MASS: Primary | ICD-10-CM

## 2018-11-15 PROCEDURE — 87205 SMEAR GRAM STAIN: CPT

## 2018-12-19 ENCOUNTER — TELEPHONE (OUTPATIENT)
Dept: RHEUMATOLOGY | Facility: CLINIC | Age: 83
End: 2018-12-19

## 2018-12-19 RX ORDER — PREDNISONE 1 MG/1
TABLET ORAL
Qty: 90 TABLET | Refills: 1 | Status: SHIPPED | OUTPATIENT
Start: 2018-12-19 | End: 2019-06-05

## 2018-12-19 NOTE — TELEPHONE ENCOUNTER
Her inflammatory arthritis has flared. She agreed to start prednisone 5 mg every morning. I will send in the prescription. She has not yet produced good phlegm samples for pulmonary diagnosis.   I encouraged her to call her pulmonologist.  Sherry Green

## 2018-12-19 NOTE — TELEPHONE ENCOUNTER
Pt called in stating that she is experiencing a lot of pain in her hands and knees due to an RA flare up. Pt stated that she is not currently taking any meds for RA, but would like to start taking a medication to assist with the pain.    Please advise

## 2018-12-19 NOTE — TELEPHONE ENCOUNTER
Called the patient to triage. Patient c/o Bilateral pain in her hands and knees that started about 1 week ago and has gotten progressively worse. States there is some swelling in bilateral hands, no swelling in the knees. No redness.  Patient is not taking

## 2019-04-22 ENCOUNTER — LAB ENCOUNTER (OUTPATIENT)
Dept: LAB | Age: 84
End: 2019-04-22
Attending: INTERNAL MEDICINE
Payer: MEDICARE

## 2019-04-22 DIAGNOSIS — Z51.81 ENCOUNTER FOR THERAPEUTIC DRUG MONITORING: ICD-10-CM

## 2019-04-22 DIAGNOSIS — R63.4 UNINTENDED WEIGHT LOSS: Primary | ICD-10-CM

## 2019-04-22 DIAGNOSIS — M05.79 RHEUMATOID ARTHRITIS INVOLVING MULTIPLE SITES WITH POSITIVE RHEUMATOID FACTOR (HCC): ICD-10-CM

## 2019-04-22 PROCEDURE — 84443 ASSAY THYROID STIM HORMONE: CPT

## 2019-04-22 PROCEDURE — 86140 C-REACTIVE PROTEIN: CPT

## 2019-04-22 PROCEDURE — 85025 COMPLETE CBC W/AUTO DIFF WBC: CPT

## 2019-04-22 PROCEDURE — 36415 COLL VENOUS BLD VENIPUNCTURE: CPT

## 2019-04-22 PROCEDURE — 85652 RBC SED RATE AUTOMATED: CPT

## 2019-04-22 PROCEDURE — 80053 COMPREHEN METABOLIC PANEL: CPT

## 2019-04-23 NOTE — PROGRESS NOTES
Belem Davis is a delightful 28-year-old woman, who I last saw October 24th of 2018, with CCP and RF positive rheumatoid arthritis. Prednisone has helped. She remains on 5mg daily. She stopped leflunomide because of hair thinning. 20mg daily 4 weeks.  O started. Review of Systems   Constitutional: Positive for fatigue. Negative for chills and fever. Respiratory: Positive for shortness of breath. Cardiovascular: Negative for chest pain. Gastrointestinal: Positive for abdominal pain.    Skin: Negat Chronic. Stay hydrated. 4. Anemia. Renal insufficiency and active RA. Less severe. 5. H/o H Pylori. Continued stomach symptoms. 6.  Hair thinning. She will continue biotin 5 mg a day over-the-counter. Leflunomide discontinued.   7.  Unresolved lung pat

## 2019-04-24 ENCOUNTER — OFFICE VISIT (OUTPATIENT)
Dept: RHEUMATOLOGY | Facility: CLINIC | Age: 84
End: 2019-04-24
Payer: MEDICARE

## 2019-04-24 VITALS
SYSTOLIC BLOOD PRESSURE: 153 MMHG | HEIGHT: 64 IN | WEIGHT: 104 LBS | HEART RATE: 71 BPM | BODY MASS INDEX: 17.75 KG/M2 | DIASTOLIC BLOOD PRESSURE: 74 MMHG

## 2019-04-24 DIAGNOSIS — D63.8 ANEMIA OF CHRONIC DISEASE: ICD-10-CM

## 2019-04-24 DIAGNOSIS — R91.1 PULMONARY NODULE: ICD-10-CM

## 2019-04-24 DIAGNOSIS — M05.79 RHEUMATOID ARTHRITIS INVOLVING MULTIPLE SITES WITH POSITIVE RHEUMATOID FACTOR (HCC): Primary | ICD-10-CM

## 2019-04-24 DIAGNOSIS — Z51.81 ENCOUNTER FOR THERAPEUTIC DRUG MONITORING: ICD-10-CM

## 2019-04-24 PROCEDURE — 99214 OFFICE O/P EST MOD 30 MIN: CPT | Performed by: INTERNAL MEDICINE

## 2019-04-24 PROCEDURE — G0463 HOSPITAL OUTPT CLINIC VISIT: HCPCS | Performed by: INTERNAL MEDICINE

## 2019-04-24 RX ORDER — SULFASALAZINE 500 MG/1
TABLET ORAL
Qty: 60 TABLET | Refills: 2 | Status: SHIPPED | OUTPATIENT
Start: 2019-04-24 | End: 2019-05-21

## 2019-05-22 RX ORDER — SULFASALAZINE 500 MG/1
TABLET ORAL
Qty: 60 TABLET | Refills: 2 | Status: SHIPPED | OUTPATIENT
Start: 2019-05-22 | End: 2019-06-05

## 2019-05-22 NOTE — TELEPHONE ENCOUNTER
Last refilled: #60 tab with 2 refills  LOV:   Future Appointments   Date Time Provider Mariah Rivera   6/5/2019  2:30 PM Alyssia Espinosa MD [facility] [place] [facility] [place]      ASSESSMENT/PLAN:      1. CCP and RF positive rheumatoid arthritis. Active.   Many Monocytes %      % 9.5   Eosinophils %      % 1.9   Basophils %      % 0.5   Immature Granulocyte %      % 0.3   SED RATE      0 - 30 mm/Hr 40 (H)   C-REACTIVE PROTEIN      <0.30 mg/dL 0.69 (H)       Please advise.

## 2019-06-04 ENCOUNTER — LAB ENCOUNTER (OUTPATIENT)
Dept: LAB | Age: 84
End: 2019-06-04
Attending: INTERNAL MEDICINE
Payer: MEDICARE

## 2019-06-04 DIAGNOSIS — Z51.81 ENCOUNTER FOR THERAPEUTIC DRUG MONITORING: ICD-10-CM

## 2019-06-04 DIAGNOSIS — M05.79 RHEUMATOID ARTHRITIS INVOLVING MULTIPLE SITES WITH POSITIVE RHEUMATOID FACTOR (HCC): ICD-10-CM

## 2019-06-04 PROCEDURE — 82040 ASSAY OF SERUM ALBUMIN: CPT

## 2019-06-04 PROCEDURE — 36415 COLL VENOUS BLD VENIPUNCTURE: CPT

## 2019-06-04 PROCEDURE — 84450 TRANSFERASE (AST) (SGOT): CPT

## 2019-06-04 PROCEDURE — 86140 C-REACTIVE PROTEIN: CPT

## 2019-06-04 PROCEDURE — 82565 ASSAY OF CREATININE: CPT

## 2019-06-04 PROCEDURE — 85652 RBC SED RATE AUTOMATED: CPT

## 2019-06-04 PROCEDURE — 85025 COMPLETE CBC W/AUTO DIFF WBC: CPT

## 2019-06-04 NOTE — PROGRESS NOTES
Meaghan Wilkerson is a delightful 43-year-old woman, who I last saw April 24th of 2019, with CCP and RF positive rheumatoid arthritis. Prednisone has helped. She misunderstood, and stopped 5mg daily after I last saw her.  Her RA has flared, with more pain, s definite diagnosis. Therefore, Robbie Valles was not started. Review of Systems   Constitutional: Positive for fatigue. Negative for chills and fever. Respiratory: Positive for shortness of breath. Cardiovascular: Negative for chest pain.    Gastrointest symptoms. 6.  Hair thinning. She will continue biotin 5 mg a day over-the-counter. Leflunomide discontinued. 7.  Unresolved lung pathology. Her symptoms are stable. She refused bronchoscopy for diagnosis. History of cigarette smoking.

## 2019-06-05 ENCOUNTER — OFFICE VISIT (OUTPATIENT)
Dept: RHEUMATOLOGY | Facility: CLINIC | Age: 84
End: 2019-06-05
Payer: MEDICARE

## 2019-06-05 VITALS
HEIGHT: 64 IN | BODY MASS INDEX: 18.27 KG/M2 | DIASTOLIC BLOOD PRESSURE: 67 MMHG | HEART RATE: 79 BPM | SYSTOLIC BLOOD PRESSURE: 122 MMHG | WEIGHT: 107 LBS

## 2019-06-05 DIAGNOSIS — Z51.81 ENCOUNTER FOR THERAPEUTIC DRUG MONITORING: ICD-10-CM

## 2019-06-05 DIAGNOSIS — D63.8 ANEMIA OF CHRONIC DISEASE: ICD-10-CM

## 2019-06-05 DIAGNOSIS — M05.79 RHEUMATOID ARTHRITIS INVOLVING MULTIPLE SITES WITH POSITIVE RHEUMATOID FACTOR (HCC): Primary | ICD-10-CM

## 2019-06-05 PROCEDURE — 99214 OFFICE O/P EST MOD 30 MIN: CPT | Performed by: INTERNAL MEDICINE

## 2019-06-05 PROCEDURE — G0463 HOSPITAL OUTPT CLINIC VISIT: HCPCS | Performed by: INTERNAL MEDICINE

## 2019-06-05 RX ORDER — SULFASALAZINE 500 MG/1
TABLET ORAL
Qty: 120 TABLET | Refills: 2 | Status: SHIPPED | OUTPATIENT
Start: 2019-06-05 | End: 2019-09-17

## 2019-06-05 RX ORDER — PYRIDOXINE HCL (VITAMIN B6) 100 MG
500 TABLET ORAL DAILY
COMMUNITY

## 2019-06-05 RX ORDER — PREDNISONE 1 MG/1
TABLET ORAL
Qty: 90 TABLET | Refills: 1 | Status: SHIPPED | OUTPATIENT
Start: 2019-06-05 | End: 2019-09-06

## 2019-06-14 RX ORDER — FOLIC ACID 1 MG/1
2 TABLET ORAL
Qty: 180 TABLET | Refills: 3 | Status: SHIPPED | OUTPATIENT
Start: 2019-06-14 | End: 2020-03-09

## 2019-09-06 RX ORDER — PREDNISONE 1 MG/1
TABLET ORAL
Qty: 90 TABLET | Refills: 1 | Status: SHIPPED | OUTPATIENT
Start: 2019-09-06 | End: 2020-03-09

## 2019-09-06 NOTE — TELEPHONE ENCOUNTER
Last filled: 6/5/19 #90 tab with 1 refill  LOV: 6/5/19   Future Appointments   Date Time Provider Mariah Rivera   9/17/2019  2:30 PM Clara Merchant MD [facility] [place] [facility] [place]     Labs:  6/4/19     ASSESSMENT/PLAN:      1.  CCP and RF positive rheumat

## 2019-09-15 NOTE — PROGRESS NOTES
Keiry Hanks is a delightful 78-year-old woman, who I last saw June 5th of 2019, with CCP and RF positive rheumatoid arthritis. Since, she restarted prednisone 5mg daily, and her RA is again much improved.  Without prednisone, her RA has flared, with mo Radha Ream was not started. Review of Systems   Constitutional: Positive for fatigue. Negative for chills and fever. Respiratory: Positive for shortness of breath. Cardiovascular: Negative for chest pain.    Gastrointestinal: Positive for abdominal slime discontinued. 7.  Unresolved lung pathology. Her symptoms are stable. She refused bronchoscopy for diagnosis. History of cigarette smoking.

## 2019-09-16 ENCOUNTER — LAB ENCOUNTER (OUTPATIENT)
Dept: LAB | Age: 84
End: 2019-09-16
Attending: INTERNAL MEDICINE
Payer: MEDICARE

## 2019-09-16 DIAGNOSIS — M05.79 RHEUMATOID ARTHRITIS INVOLVING MULTIPLE SITES WITH POSITIVE RHEUMATOID FACTOR (HCC): ICD-10-CM

## 2019-09-16 DIAGNOSIS — Z51.81 ENCOUNTER FOR THERAPEUTIC DRUG MONITORING: ICD-10-CM

## 2019-09-16 LAB
ALBUMIN SERPL-MCNC: 3.5 G/DL (ref 3.4–5)
AST SERPL-CCNC: 31 U/L (ref 15–37)
BASOPHILS # BLD AUTO: 0.02 X10(3) UL (ref 0–0.2)
BASOPHILS NFR BLD AUTO: 0.4 %
CREAT BLD-MCNC: 1.16 MG/DL (ref 0.55–1.02)
CRP SERPL-MCNC: 0.98 MG/DL (ref ?–0.3)
DEPRECATED RDW RBC AUTO: 49.1 FL (ref 35.1–46.3)
EOSINOPHIL # BLD AUTO: 0.08 X10(3) UL (ref 0–0.7)
EOSINOPHIL NFR BLD AUTO: 1.6 %
ERYTHROCYTE [DISTWIDTH] IN BLOOD BY AUTOMATED COUNT: 13.2 % (ref 11–15)
ERYTHROCYTE [SEDIMENTATION RATE] IN BLOOD: 20 MM/HR (ref 0–30)
HCT VFR BLD AUTO: 37.3 % (ref 35–48)
HGB BLD-MCNC: 12.3 G/DL (ref 12–16)
IMM GRANULOCYTES # BLD AUTO: 0.01 X10(3) UL (ref 0–1)
IMM GRANULOCYTES NFR BLD: 0.2 %
LYMPHOCYTES # BLD AUTO: 0.94 X10(3) UL (ref 1–4)
LYMPHOCYTES NFR BLD AUTO: 18.7 %
MCH RBC QN AUTO: 32.9 PG (ref 26–34)
MCHC RBC AUTO-ENTMCNC: 33 G/DL (ref 31–37)
MCV RBC AUTO: 99.7 FL (ref 80–100)
MONOCYTES # BLD AUTO: 0.52 X10(3) UL (ref 0.1–1)
MONOCYTES NFR BLD AUTO: 10.3 %
NEUTROPHILS # BLD AUTO: 3.47 X10 (3) UL (ref 1.5–7.7)
NEUTROPHILS # BLD AUTO: 3.47 X10(3) UL (ref 1.5–7.7)
NEUTROPHILS NFR BLD AUTO: 68.8 %
PLATELET # BLD AUTO: 216 10(3)UL (ref 150–450)
RBC # BLD AUTO: 3.74 X10(6)UL (ref 3.8–5.3)
WBC # BLD AUTO: 5 X10(3) UL (ref 4–11)

## 2019-09-16 PROCEDURE — 36415 COLL VENOUS BLD VENIPUNCTURE: CPT

## 2019-09-16 PROCEDURE — 82040 ASSAY OF SERUM ALBUMIN: CPT

## 2019-09-16 PROCEDURE — 86140 C-REACTIVE PROTEIN: CPT

## 2019-09-16 PROCEDURE — 85652 RBC SED RATE AUTOMATED: CPT

## 2019-09-16 PROCEDURE — 84450 TRANSFERASE (AST) (SGOT): CPT

## 2019-09-16 PROCEDURE — 85025 COMPLETE CBC W/AUTO DIFF WBC: CPT

## 2019-09-16 PROCEDURE — 82565 ASSAY OF CREATININE: CPT

## 2019-09-17 ENCOUNTER — OFFICE VISIT (OUTPATIENT)
Dept: RHEUMATOLOGY | Facility: CLINIC | Age: 84
End: 2019-09-17
Payer: MEDICARE

## 2019-09-17 VITALS
WEIGHT: 107 LBS | SYSTOLIC BLOOD PRESSURE: 109 MMHG | HEIGHT: 64 IN | HEART RATE: 86 BPM | BODY MASS INDEX: 18.27 KG/M2 | DIASTOLIC BLOOD PRESSURE: 63 MMHG

## 2019-09-17 DIAGNOSIS — M05.79 RHEUMATOID ARTHRITIS INVOLVING MULTIPLE SITES WITH POSITIVE RHEUMATOID FACTOR (HCC): Primary | ICD-10-CM

## 2019-09-17 DIAGNOSIS — Z51.81 ENCOUNTER FOR THERAPEUTIC DRUG MONITORING: ICD-10-CM

## 2019-09-17 PROCEDURE — G0463 HOSPITAL OUTPT CLINIC VISIT: HCPCS | Performed by: INTERNAL MEDICINE

## 2019-09-17 PROCEDURE — 99214 OFFICE O/P EST MOD 30 MIN: CPT | Performed by: INTERNAL MEDICINE

## 2019-09-17 RX ORDER — SULFASALAZINE 500 MG/1
TABLET ORAL
Qty: 540 TABLET | Refills: 1 | Status: SHIPPED | OUTPATIENT
Start: 2019-09-17 | End: 2020-04-03

## 2019-12-02 ENCOUNTER — LAB ENCOUNTER (OUTPATIENT)
Dept: LAB | Age: 84
End: 2019-12-02
Attending: INTERNAL MEDICINE
Payer: MEDICARE

## 2019-12-02 DIAGNOSIS — R63.4 UNINTENDED WEIGHT LOSS: ICD-10-CM

## 2019-12-02 PROCEDURE — 80053 COMPREHEN METABOLIC PANEL: CPT

## 2019-12-02 PROCEDURE — 85652 RBC SED RATE AUTOMATED: CPT

## 2019-12-02 PROCEDURE — 85025 COMPLETE CBC W/AUTO DIFF WBC: CPT

## 2019-12-02 PROCEDURE — 86140 C-REACTIVE PROTEIN: CPT

## 2019-12-02 PROCEDURE — 36415 COLL VENOUS BLD VENIPUNCTURE: CPT

## 2019-12-02 PROCEDURE — 84443 ASSAY THYROID STIM HORMONE: CPT

## 2019-12-03 NOTE — PROGRESS NOTES
Geni Douglas is a delightful 52-year-old woman, who I last saw September 17th of 2019, with CCP and RF positive rheumatoid arthritis. Since, she has continued prednisone 5mg daily. Her sulfasalazine was increased to 1500 mg twice a day.   Her RA remain She refused bronchoscopy, so there is no definite diagnosis. Therefore, Eino Speed was not started. Review of Systems   Constitutional: Positive for fatigue. Negative for chills and fever. Respiratory: Negative for shortness of breath.     Cardiovascular inflammation markers are again more elevated suggesting some other process. Her TSH is mildly elevated so she may have hypothyroidism. She complains of fatigue. 6.  Hair thinning. She will continue biotin 5 mg a day over-the-counter.  Leflunomide discon

## 2019-12-04 ENCOUNTER — OFFICE VISIT (OUTPATIENT)
Dept: RHEUMATOLOGY | Facility: CLINIC | Age: 84
End: 2019-12-04
Payer: MEDICARE

## 2019-12-04 VITALS
BODY MASS INDEX: 17.93 KG/M2 | SYSTOLIC BLOOD PRESSURE: 133 MMHG | WEIGHT: 105 LBS | DIASTOLIC BLOOD PRESSURE: 63 MMHG | HEIGHT: 64 IN | HEART RATE: 91 BPM

## 2019-12-04 DIAGNOSIS — R10.84 GENERALIZED ABDOMINAL PAIN: ICD-10-CM

## 2019-12-04 DIAGNOSIS — Z51.81 ENCOUNTER FOR THERAPEUTIC DRUG MONITORING: ICD-10-CM

## 2019-12-04 DIAGNOSIS — M05.79 RHEUMATOID ARTHRITIS INVOLVING MULTIPLE SITES WITH POSITIVE RHEUMATOID FACTOR (HCC): Primary | ICD-10-CM

## 2019-12-04 PROCEDURE — G0463 HOSPITAL OUTPT CLINIC VISIT: HCPCS | Performed by: INTERNAL MEDICINE

## 2019-12-04 PROCEDURE — 99214 OFFICE O/P EST MOD 30 MIN: CPT | Performed by: INTERNAL MEDICINE

## 2020-03-09 RX ORDER — FOLIC ACID 1 MG/1
TABLET ORAL
Qty: 180 TABLET | Refills: 3 | Status: SHIPPED | OUTPATIENT
Start: 2020-03-09 | End: 2021-03-03

## 2020-03-09 RX ORDER — PREDNISONE 1 MG/1
TABLET ORAL
Qty: 90 TABLET | Refills: 1 | Status: SHIPPED | OUTPATIENT
Start: 2020-03-09 | End: 2020-09-04

## 2020-03-09 NOTE — TELEPHONE ENCOUNTER
FOLIC ACID  Last filled: 6/14/19 #180 tab with 3 refills     Prednisone  Last filled: 9/6/19 #90 tab with 1 refill    LOV: 12/4/19  Future Appointments   Date Time Provider Mariah Rivera   3/25/2020  2:50 PM Mary Vargas MD [facility] [place] [facility] [place]

## 2020-03-23 ENCOUNTER — TELEPHONE (OUTPATIENT)
Dept: RHEUMATOLOGY | Facility: CLINIC | Age: 85
End: 2020-03-23

## 2020-03-23 ENCOUNTER — LAB ENCOUNTER (OUTPATIENT)
Dept: LAB | Age: 85
End: 2020-03-23
Attending: INTERNAL MEDICINE
Payer: MEDICARE

## 2020-03-23 DIAGNOSIS — Z51.81 ENCOUNTER FOR THERAPEUTIC DRUG MONITORING: ICD-10-CM

## 2020-03-23 DIAGNOSIS — M05.79 RHEUMATOID ARTHRITIS INVOLVING MULTIPLE SITES WITH POSITIVE RHEUMATOID FACTOR (HCC): ICD-10-CM

## 2020-03-23 LAB
ALBUMIN SERPL-MCNC: 3.3 G/DL (ref 3.4–5)
AST SERPL-CCNC: 23 U/L (ref 15–37)
BASOPHILS # BLD AUTO: 0.02 X10(3) UL (ref 0–0.2)
BASOPHILS NFR BLD AUTO: 0.4 %
CREAT BLD-MCNC: 1.07 MG/DL (ref 0.55–1.02)
CRP SERPL-MCNC: 0.72 MG/DL (ref ?–0.3)
DEPRECATED RDW RBC AUTO: 49.6 FL (ref 35.1–46.3)
EOSINOPHIL # BLD AUTO: 0.11 X10(3) UL (ref 0–0.7)
EOSINOPHIL NFR BLD AUTO: 2.3 %
ERYTHROCYTE [DISTWIDTH] IN BLOOD BY AUTOMATED COUNT: 13.2 % (ref 11–15)
ERYTHROCYTE [SEDIMENTATION RATE] IN BLOOD: 50 MM/HR (ref 0–30)
HCT VFR BLD AUTO: 33.1 % (ref 35–48)
HGB BLD-MCNC: 10.8 G/DL (ref 12–16)
IMM GRANULOCYTES # BLD AUTO: 0.01 X10(3) UL (ref 0–1)
IMM GRANULOCYTES NFR BLD: 0.2 %
LYMPHOCYTES # BLD AUTO: 1.12 X10(3) UL (ref 1–4)
LYMPHOCYTES NFR BLD AUTO: 23 %
MCH RBC QN AUTO: 33.5 PG (ref 26–34)
MCHC RBC AUTO-ENTMCNC: 32.6 G/DL (ref 31–37)
MCV RBC AUTO: 102.8 FL (ref 80–100)
MONOCYTES # BLD AUTO: 0.56 X10(3) UL (ref 0.1–1)
MONOCYTES NFR BLD AUTO: 11.5 %
NEUTROPHILS # BLD AUTO: 3.06 X10 (3) UL (ref 1.5–7.7)
NEUTROPHILS # BLD AUTO: 3.06 X10(3) UL (ref 1.5–7.7)
NEUTROPHILS NFR BLD AUTO: 62.6 %
PLATELET # BLD AUTO: 201 10(3)UL (ref 150–450)
RBC # BLD AUTO: 3.22 X10(6)UL (ref 3.8–5.3)
WBC # BLD AUTO: 4.9 X10(3) UL (ref 4–11)

## 2020-03-23 PROCEDURE — 36415 COLL VENOUS BLD VENIPUNCTURE: CPT

## 2020-03-23 PROCEDURE — 86140 C-REACTIVE PROTEIN: CPT

## 2020-03-23 PROCEDURE — 82040 ASSAY OF SERUM ALBUMIN: CPT

## 2020-03-23 PROCEDURE — 82565 ASSAY OF CREATININE: CPT

## 2020-03-23 PROCEDURE — 85652 RBC SED RATE AUTOMATED: CPT

## 2020-03-23 PROCEDURE — 85025 COMPLETE CBC W/AUTO DIFF WBC: CPT

## 2020-03-23 PROCEDURE — 84450 TRANSFERASE (AST) (SGOT): CPT

## 2020-03-23 NOTE — TELEPHONE ENCOUNTER
Patient consents to telephone visit. Informed of charge after five minutes of talking with MD.    Patient had labs completed today and would like to discuss results at appt time on Wednesday.

## 2020-03-25 ENCOUNTER — TELEPHONE (OUTPATIENT)
Dept: RHEUMATOLOGY | Facility: CLINIC | Age: 85
End: 2020-03-25

## 2020-03-25 PROCEDURE — G2012 BRIEF CHECK IN BY MD/QHP: HCPCS | Performed by: INTERNAL MEDICINE

## 2020-03-25 NOTE — TELEPHONE ENCOUNTER
Virtual/Telephone Check-In    Gregorio Donohue verbally consents to a Virtual/Telephone Check-In service on 03/25/20. Patient understands and accepts financial responsibility for any deductible, co-insurance and/or co-pays associated with this service. She continues to follow with her pulmonologist.  She has areas of pulmonary consolidation predominantly in the upper and middle lobes. There was a pleural-based density in the anterior left upper lung field, making empyema difficult to exclude.   There wer

## 2020-04-03 RX ORDER — SULFASALAZINE 500 MG/1
TABLET ORAL
Qty: 540 TABLET | Refills: 1 | Status: SHIPPED | OUTPATIENT
Start: 2020-04-03 | End: 2020-10-09

## 2020-04-03 NOTE — TELEPHONE ENCOUNTER
Requested Prescriptions     Pending Prescriptions Disp Refills   • sulfaSALAzine 500 MG Oral Tab 540 tablet 1     Sig: TAKE 3 TABLETS BY MOUTH TWICE DAILY     Last filled: 9/17/19 #540 tab w/ 1 rf   LOV: 12/4/19   No future appointments.   Labs:   Component I gave her a copy of all of her lab results. Her inflammation markers are again more elevated suggesting some other process. Her TSH is mildly elevated so she may have hypothyroidism. She complains of fatigue. 6.  Hair thinning.   She will continue biot

## 2020-07-31 NOTE — PROGRESS NOTES
Drew Collier is a delightful 81-year-old woman, who I last saw December 4th of 2019, with CCP and RF positive rheumatoid arthritis. Since, she has continued prednisone 5mg daily, and sulfasalazine 1500 mg twice a day. Her RA remains much improved.  She Therefore, Nickola Basil was not started. Review of Systems   Constitutional: Positive for fatigue. Negative for chills and fever. Respiratory: Negative for shortness of breath. Cardiovascular: Negative for chest pain.    Gastrointestinal: Positive for oc discontinued. 6.  Unresolved lung pathology. Her symptoms are stable. She refused bronchoscopy for diagnosis. History of cigarette smoking. 7.  Diffuse rash from sun exposure 2 weeks ago. This is the first time that this has occurred.   She has been on

## 2020-08-03 ENCOUNTER — LAB ENCOUNTER (OUTPATIENT)
Dept: LAB | Age: 85
End: 2020-08-03
Attending: INTERNAL MEDICINE
Payer: MEDICARE

## 2020-08-03 DIAGNOSIS — M05.79 RHEUMATOID ARTHRITIS INVOLVING MULTIPLE SITES WITH POSITIVE RHEUMATOID FACTOR (HCC): ICD-10-CM

## 2020-08-03 DIAGNOSIS — Z51.81 ENCOUNTER FOR THERAPEUTIC DRUG MONITORING: ICD-10-CM

## 2020-08-03 LAB
ALBUMIN SERPL-MCNC: 3.4 G/DL (ref 3.4–5)
AST SERPL-CCNC: 32 U/L (ref 15–37)
BASOPHILS # BLD AUTO: 0.01 X10(3) UL (ref 0–0.2)
BASOPHILS NFR BLD AUTO: 0.2 %
CREAT BLD-MCNC: 1.07 MG/DL (ref 0.55–1.02)
CRP SERPL-MCNC: <0.29 MG/DL (ref ?–0.3)
DEPRECATED RDW RBC AUTO: 48.3 FL (ref 35.1–46.3)
EOSINOPHIL # BLD AUTO: 0.11 X10(3) UL (ref 0–0.7)
EOSINOPHIL NFR BLD AUTO: 2.4 %
ERYTHROCYTE [DISTWIDTH] IN BLOOD BY AUTOMATED COUNT: 12.8 % (ref 11–15)
ERYTHROCYTE [SEDIMENTATION RATE] IN BLOOD: 27 MM/HR (ref 0–30)
HCT VFR BLD AUTO: 34.9 % (ref 35–48)
HGB BLD-MCNC: 11.7 G/DL (ref 12–16)
IMM GRANULOCYTES # BLD AUTO: 0.01 X10(3) UL (ref 0–1)
IMM GRANULOCYTES NFR BLD: 0.2 %
LYMPHOCYTES # BLD AUTO: 1.1 X10(3) UL (ref 1–4)
LYMPHOCYTES NFR BLD AUTO: 23.9 %
MCH RBC QN AUTO: 34.4 PG (ref 26–34)
MCHC RBC AUTO-ENTMCNC: 33.5 G/DL (ref 31–37)
MCV RBC AUTO: 102.6 FL (ref 80–100)
MONOCYTES # BLD AUTO: 0.57 X10(3) UL (ref 0.1–1)
MONOCYTES NFR BLD AUTO: 12.4 %
NEUTROPHILS # BLD AUTO: 2.8 X10 (3) UL (ref 1.5–7.7)
NEUTROPHILS # BLD AUTO: 2.8 X10(3) UL (ref 1.5–7.7)
NEUTROPHILS NFR BLD AUTO: 60.9 %
PLATELET # BLD AUTO: 188 10(3)UL (ref 150–450)
RBC # BLD AUTO: 3.4 X10(6)UL (ref 3.8–5.3)
WBC # BLD AUTO: 4.6 X10(3) UL (ref 4–11)

## 2020-08-03 PROCEDURE — 86140 C-REACTIVE PROTEIN: CPT

## 2020-08-03 PROCEDURE — 82565 ASSAY OF CREATININE: CPT

## 2020-08-03 PROCEDURE — 84450 TRANSFERASE (AST) (SGOT): CPT

## 2020-08-03 PROCEDURE — 85025 COMPLETE CBC W/AUTO DIFF WBC: CPT

## 2020-08-03 PROCEDURE — 82040 ASSAY OF SERUM ALBUMIN: CPT

## 2020-08-03 PROCEDURE — 85652 RBC SED RATE AUTOMATED: CPT

## 2020-08-03 PROCEDURE — 36415 COLL VENOUS BLD VENIPUNCTURE: CPT

## 2020-08-05 ENCOUNTER — OFFICE VISIT (OUTPATIENT)
Dept: RHEUMATOLOGY | Facility: CLINIC | Age: 85
End: 2020-08-05
Payer: MEDICARE

## 2020-08-05 VITALS
SYSTOLIC BLOOD PRESSURE: 122 MMHG | HEART RATE: 89 BPM | DIASTOLIC BLOOD PRESSURE: 65 MMHG | HEIGHT: 64 IN | WEIGHT: 102 LBS | BODY MASS INDEX: 17.42 KG/M2

## 2020-08-05 DIAGNOSIS — R21 RASH: ICD-10-CM

## 2020-08-05 DIAGNOSIS — Z51.81 ENCOUNTER FOR THERAPEUTIC DRUG MONITORING: ICD-10-CM

## 2020-08-05 DIAGNOSIS — M05.79 RHEUMATOID ARTHRITIS INVOLVING MULTIPLE SITES WITH POSITIVE RHEUMATOID FACTOR (HCC): Primary | ICD-10-CM

## 2020-08-05 PROCEDURE — 99214 OFFICE O/P EST MOD 30 MIN: CPT | Performed by: INTERNAL MEDICINE

## 2020-08-05 PROCEDURE — G0463 HOSPITAL OUTPT CLINIC VISIT: HCPCS | Performed by: INTERNAL MEDICINE

## 2020-09-04 RX ORDER — PREDNISONE 1 MG/1
5 TABLET ORAL DAILY
Qty: 90 TABLET | Refills: 1 | Status: SHIPPED | OUTPATIENT
Start: 2020-09-04 | End: 2021-03-03

## 2020-09-04 NOTE — TELEPHONE ENCOUNTER
Fax received requesting refill, with 90 day supply.      Current Outpatient Medications   Medication Sig Dispense Refill   • PREDNISONE 5 MG Oral Tab TAKE 1 TABLET BY MOUTH DAILY 90 tablet 1

## 2020-09-04 NOTE — TELEPHONE ENCOUNTER
Last filled: 3/9/2020 #90 tab with 1 refill  LOV: 8/5/2020   No future appointments. Labs:   ASSESSMENT/PLAN:      1. CCP and RF positive rheumatoid arthritis. Doing well. She will continue prednisone 5 mg a day, and sulfasalazine 1500mg twice daily.  She

## 2020-10-09 RX ORDER — SULFASALAZINE 500 MG/1
TABLET ORAL
Qty: 540 TABLET | Refills: 1 | Status: SHIPPED | OUTPATIENT
Start: 2020-10-09 | End: 2021-04-06

## 2020-10-09 NOTE — TELEPHONE ENCOUNTER
Last filled:4/3/2020 #540 tab with 1 refill   LOV: 8/5/2020   No future appointments. Labs:   ASSESSMENT/PLAN:      1. CCP and RF positive rheumatoid arthritis.  Doing well. She will continue prednisone 5 mg a day, and sulfasalazine 1500mg twice daily.  Sh

## 2020-10-21 ENCOUNTER — WALK IN (OUTPATIENT)
Dept: URGENT CARE | Age: 85
End: 2020-10-21

## 2020-10-21 VITALS — TEMPERATURE: 98.6 F

## 2020-10-21 DIAGNOSIS — Z23 NEED FOR PROPHYLACTIC VACCINATION AND INOCULATION AGAINST INFLUENZA: Primary | ICD-10-CM

## 2020-10-21 PROCEDURE — G0008 ADMIN INFLUENZA VIRUS VAC: HCPCS | Performed by: NURSE PRACTITIONER

## 2020-10-21 PROCEDURE — 90662 IIV NO PRSV INCREASED AG IM: CPT | Performed by: NURSE PRACTITIONER

## 2021-03-03 RX ORDER — FOLIC ACID 1 MG/1
2 TABLET ORAL DAILY
Qty: 180 TABLET | Refills: 3 | Status: SHIPPED | OUTPATIENT
Start: 2021-03-03 | End: 2022-01-18

## 2021-03-03 RX ORDER — PREDNISONE 1 MG/1
5 TABLET ORAL DAILY
Qty: 90 TABLET | Refills: 1 | Status: SHIPPED | OUTPATIENT
Start: 2021-03-03 | End: 2021-04-06

## 2021-03-03 NOTE — TELEPHONE ENCOUNTER
Refill request on the following. Current Outpatient Medications   Medication Sig Dispense Refill   •       • predniSONE 5 MG Oral Tab Take 1 tablet (5 mg total) by mouth daily.  90 tablet 1   •       • FOLIC ACID 1 MG Oral Tab TAKE 2 TABLETS(2 MG) BY THONG

## 2021-03-03 NOTE — TELEPHONE ENCOUNTER
LOV: 8/5/2020  No future appointments.   Labs:   Component      Latest Ref Rng & Units 8/3/2020   WBC      4.0 - 11.0 x10(3) uL 4.6   RBC      3.80 - 5.30 x10(6)uL 3.40 (L)   Hemoglobin      12.0 - 16.0 g/dL 11.7 (L)   Hematocrit      35.0 - 48.0 % 34.9 (L)

## 2021-03-29 ENCOUNTER — TELEPHONE (OUTPATIENT)
Dept: RHEUMATOLOGY | Facility: CLINIC | Age: 86
End: 2021-03-29

## 2021-03-29 DIAGNOSIS — Z51.81 ENCOUNTER FOR THERAPEUTIC DRUG MONITORING: ICD-10-CM

## 2021-03-29 DIAGNOSIS — M05.79 RHEUMATOID ARTHRITIS INVOLVING MULTIPLE SITES WITH POSITIVE RHEUMATOID FACTOR (HCC): Primary | ICD-10-CM

## 2021-03-29 NOTE — TELEPHONE ENCOUNTER
Let patient know orders are now in system, she rescheduled appt with Dr Maia Bocanegra in [facility] on 4/6/21 at 2:10pm.

## 2021-03-29 NOTE — TELEPHONE ENCOUNTER
Patient came to [facility] to explain she went downstairs to get lab work done but her order was . She explained she had an appt tomorrow with Dr. Zoraida Salguero but I couldn't promise the order would have been done by today so patient canceled and would like for us to call her when orders are in so she can make another appt.

## 2021-04-04 NOTE — PROGRESS NOTES
Parul Lugo is a delightful 78-year-old woman, who I last saw August 5th of 2020, with CCP and RF positive rheumatoid arthritis. Since, she has continued prednisone 5mg daily, and sulfasalazine 1500 mg twice a day. Her RA remains much improved.  She d shortness of breath. Cardiovascular: Negative for chest pain. Gastrointestinal: Positive for occasional abdominal pain and nausea. Skin: Negative for rash. Hematological: Negative for adenopathy.      Allergies:No Known Allergies     PHYSICAL EXAM: smoking.

## 2021-04-05 ENCOUNTER — LAB ENCOUNTER (OUTPATIENT)
Dept: LAB | Age: 86
End: 2021-04-05
Attending: INTERNAL MEDICINE
Payer: MEDICARE

## 2021-04-05 DIAGNOSIS — M05.79 RHEUMATOID ARTHRITIS INVOLVING MULTIPLE SITES WITH POSITIVE RHEUMATOID FACTOR (HCC): ICD-10-CM

## 2021-04-05 DIAGNOSIS — Z51.81 ENCOUNTER FOR THERAPEUTIC DRUG MONITORING: ICD-10-CM

## 2021-04-05 PROCEDURE — 84450 TRANSFERASE (AST) (SGOT): CPT

## 2021-04-05 PROCEDURE — 86140 C-REACTIVE PROTEIN: CPT

## 2021-04-05 PROCEDURE — 82565 ASSAY OF CREATININE: CPT

## 2021-04-05 PROCEDURE — 36415 COLL VENOUS BLD VENIPUNCTURE: CPT

## 2021-04-05 PROCEDURE — 85025 COMPLETE CBC W/AUTO DIFF WBC: CPT

## 2021-04-05 PROCEDURE — 82040 ASSAY OF SERUM ALBUMIN: CPT

## 2021-04-05 PROCEDURE — 85652 RBC SED RATE AUTOMATED: CPT

## 2021-04-06 ENCOUNTER — OFFICE VISIT (OUTPATIENT)
Dept: RHEUMATOLOGY | Facility: CLINIC | Age: 86
End: 2021-04-06
Payer: MEDICARE

## 2021-04-06 VITALS
SYSTOLIC BLOOD PRESSURE: 141 MMHG | HEART RATE: 79 BPM | DIASTOLIC BLOOD PRESSURE: 91 MMHG | BODY MASS INDEX: 18.27 KG/M2 | HEIGHT: 64 IN | WEIGHT: 107 LBS

## 2021-04-06 DIAGNOSIS — Z51.81 ENCOUNTER FOR THERAPEUTIC DRUG MONITORING: ICD-10-CM

## 2021-04-06 DIAGNOSIS — M05.79 RHEUMATOID ARTHRITIS INVOLVING MULTIPLE SITES WITH POSITIVE RHEUMATOID FACTOR (HCC): Primary | ICD-10-CM

## 2021-04-06 PROCEDURE — 99214 OFFICE O/P EST MOD 30 MIN: CPT | Performed by: INTERNAL MEDICINE

## 2021-04-06 RX ORDER — PREDNISONE 1 MG/1
TABLET ORAL
Qty: 360 TABLET | Refills: 1 | Status: SHIPPED | OUTPATIENT
Start: 2021-04-06 | End: 2021-09-21

## 2021-04-06 RX ORDER — SULFASALAZINE 500 MG/1
TABLET ORAL
Qty: 540 TABLET | Refills: 1 | Status: SHIPPED | OUTPATIENT
Start: 2021-04-06 | End: 2021-04-12

## 2021-04-12 RX ORDER — SULFASALAZINE 500 MG/1
TABLET ORAL
Qty: 540 TABLET | Refills: 1 | Status: SHIPPED | OUTPATIENT
Start: 2021-04-12 | End: 2021-09-21

## 2021-04-12 NOTE — TELEPHONE ENCOUNTER
LOV: 04/06/2021  No future appointments.    LABS:  Component      Latest Ref Rng & Units 4/5/2021   WBC      4.0 - 11.0 x10(3) uL 5.9   RBC      3.80 - 5.30 x10(6)uL 3.16 (L)   Hemoglobin      12.0 - 16.0 g/dL 10.8 (L)   Hematocrit      35.0 - 48.0 % 32.7 (

## 2021-08-30 RX ORDER — PREDNISONE 1 MG/1
TABLET ORAL
Qty: 90 TABLET | Refills: 1 | Status: SHIPPED | OUTPATIENT
Start: 2021-08-30 | End: 2021-09-21

## 2021-09-17 NOTE — PROGRESS NOTES
(1) Continue taking Synthroid 100mcg 1 tablet daily on Mondays-Saturdays and 1 1/2 tablets on Sundays.    Take your Synthroid at least 30-60 minutes before food and at least 4 hours before multivitamins or any supplements/vitamins containing calcium, iron, and soy.    If you are taking Biotin or anything that may contain Biotin (such as a B Complex vitamin or a hair/skin/nails supplement), stop taking it at least a week prior to any thyroid blood tests as it can interfere with the accuracy of the test.    (2) Go to the lab for thyroid blood tests in mid-/late October.    If you have not heard from my office regarding your blood tests results within 5 days, please contact [phone] or send a myaNUMBER message.    (3) Continue taking vitamin D3 2000 IU intermittently.    (4) You should be getting 1000-1200mg a day of calcium (that's a total of calcium in your diet AND calcium supplements [if you are taking any]).    (5) Call [phone] to schedule your DEXA scan for right before your next appointment in 2020.   Rich Chiang is a delightful 23-year-old woman, who I last saw April 6th of 2021, with CCP and RF positive rheumatoid arthritis. Since, she has been down to  prednisone 4 mg daily, and has continued sulfasalazine 1500 mg twice a day.   Her RA remains mu Cardiovascular: Negative for chest pain. Gastrointestinal: Positive for occasional abdominal pain and nausea. Skin: Negative for rash. Hematological: Negative for adenopathy.      Allergies:No Known Allergies     PHYSICAL EXAM:   Blood pressure 146/

## 2021-09-20 ENCOUNTER — LAB ENCOUNTER (OUTPATIENT)
Dept: LAB | Age: 86
End: 2021-09-20
Attending: INTERNAL MEDICINE
Payer: MEDICARE

## 2021-09-20 DIAGNOSIS — M05.79 RHEUMATOID ARTHRITIS INVOLVING MULTIPLE SITES WITH POSITIVE RHEUMATOID FACTOR (HCC): ICD-10-CM

## 2021-09-20 DIAGNOSIS — Z51.81 ENCOUNTER FOR THERAPEUTIC DRUG MONITORING: ICD-10-CM

## 2021-09-20 LAB
ALBUMIN SERPL-MCNC: 3.3 G/DL (ref 3.4–5)
AST SERPL-CCNC: 24 U/L (ref 15–37)
BASOPHILS # BLD AUTO: 0.02 X10(3) UL (ref 0–0.2)
BASOPHILS NFR BLD AUTO: 0.4 %
CREAT BLD-MCNC: 1.06 MG/DL
CRP SERPL-MCNC: 0.62 MG/DL (ref ?–0.3)
DEPRECATED RDW RBC AUTO: 48.4 FL (ref 35.1–46.3)
EOSINOPHIL # BLD AUTO: 0.1 X10(3) UL (ref 0–0.7)
EOSINOPHIL NFR BLD AUTO: 2 %
ERYTHROCYTE [DISTWIDTH] IN BLOOD BY AUTOMATED COUNT: 12.5 % (ref 11–15)
ERYTHROCYTE [SEDIMENTATION RATE] IN BLOOD: 41 MM/HR
HCT VFR BLD AUTO: 36.8 %
HGB BLD-MCNC: 12.5 G/DL
IMM GRANULOCYTES # BLD AUTO: 0.02 X10(3) UL (ref 0–1)
IMM GRANULOCYTES NFR BLD: 0.4 %
LYMPHOCYTES # BLD AUTO: 0.97 X10(3) UL (ref 1–4)
LYMPHOCYTES NFR BLD AUTO: 19.5 %
MCH RBC QN AUTO: 35.5 PG (ref 26–34)
MCHC RBC AUTO-ENTMCNC: 34 G/DL (ref 31–37)
MCV RBC AUTO: 104.5 FL
MONOCYTES # BLD AUTO: 0.51 X10(3) UL (ref 0.1–1)
MONOCYTES NFR BLD AUTO: 10.3 %
NEUTROPHILS # BLD AUTO: 3.35 X10 (3) UL (ref 1.5–7.7)
NEUTROPHILS # BLD AUTO: 3.35 X10(3) UL (ref 1.5–7.7)
NEUTROPHILS NFR BLD AUTO: 67.4 %
PLATELET # BLD AUTO: 235 10(3)UL (ref 150–450)
RBC # BLD AUTO: 3.52 X10(6)UL
WBC # BLD AUTO: 5 X10(3) UL (ref 4–11)

## 2021-09-20 PROCEDURE — 82040 ASSAY OF SERUM ALBUMIN: CPT

## 2021-09-20 PROCEDURE — 86140 C-REACTIVE PROTEIN: CPT

## 2021-09-20 PROCEDURE — 36415 COLL VENOUS BLD VENIPUNCTURE: CPT

## 2021-09-20 PROCEDURE — 85025 COMPLETE CBC W/AUTO DIFF WBC: CPT

## 2021-09-20 PROCEDURE — 85652 RBC SED RATE AUTOMATED: CPT

## 2021-09-20 PROCEDURE — 84450 TRANSFERASE (AST) (SGOT): CPT

## 2021-09-20 PROCEDURE — 82565 ASSAY OF CREATININE: CPT

## 2021-09-21 ENCOUNTER — OFFICE VISIT (OUTPATIENT)
Dept: RHEUMATOLOGY | Facility: CLINIC | Age: 86
End: 2021-09-21
Payer: MEDICARE

## 2021-09-21 VITALS
HEIGHT: 64 IN | HEART RATE: 86 BPM | BODY MASS INDEX: 18.1 KG/M2 | DIASTOLIC BLOOD PRESSURE: 76 MMHG | WEIGHT: 106 LBS | SYSTOLIC BLOOD PRESSURE: 146 MMHG

## 2021-09-21 DIAGNOSIS — M05.79 RHEUMATOID ARTHRITIS INVOLVING MULTIPLE SITES WITH POSITIVE RHEUMATOID FACTOR (HCC): Primary | ICD-10-CM

## 2021-09-21 DIAGNOSIS — Z51.81 ENCOUNTER FOR THERAPEUTIC DRUG MONITORING: ICD-10-CM

## 2021-09-21 PROCEDURE — 99214 OFFICE O/P EST MOD 30 MIN: CPT | Performed by: INTERNAL MEDICINE

## 2021-09-21 RX ORDER — SULFASALAZINE 500 MG/1
TABLET ORAL
Qty: 540 TABLET | Refills: 1 | Status: SHIPPED | OUTPATIENT
Start: 2021-09-21 | End: 2022-01-18

## 2021-09-21 RX ORDER — PREDNISONE 1 MG/1
TABLET ORAL
Qty: 360 TABLET | Refills: 1 | Status: SHIPPED | OUTPATIENT
Start: 2021-09-21 | End: 2022-01-18

## 2022-01-14 NOTE — PROGRESS NOTES
Fe Singh is a delightful 25-year-old woman, who I last saw September 21st of 2021, with CCP and RF positive rheumatoid arthritis. Since, she has continued  prednisone 4 mg daily, and sulfasalazine 1500 mg twice a day. Her RA remains much improved. of Systems   Constitutional: Positive for fatigue. Negative for chills and fever. Respiratory: Negative for shortness of breath. Cardiovascular: Negative for chest pain. Gastrointestinal: Positive for occasional abdominal pain and nausea.    Skin: Ne refused bronchoscopy for diagnosis. History of cigarette smoking. 7.  Fatigue. TSH will be checked. 8.  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 Vitamins B1, B12, TSH, and hemoglobin A1c were ordered.

## 2022-01-17 ENCOUNTER — LAB ENCOUNTER (OUTPATIENT)
Dept: LAB | Age: 87
End: 2022-01-17
Attending: INTERNAL MEDICINE
Payer: MEDICARE

## 2022-01-17 DIAGNOSIS — G62.9 SENSORY NEUROPATHY: ICD-10-CM

## 2022-01-17 DIAGNOSIS — M05.79 RHEUMATOID ARTHRITIS INVOLVING MULTIPLE SITES WITH POSITIVE RHEUMATOID FACTOR (HCC): ICD-10-CM

## 2022-01-17 DIAGNOSIS — Z51.81 ENCOUNTER FOR THERAPEUTIC DRUG MONITORING: ICD-10-CM

## 2022-01-17 DIAGNOSIS — R73.9 HYPERGLYCEMIA: ICD-10-CM

## 2022-01-17 LAB
ALBUMIN SERPL-MCNC: 3.4 G/DL (ref 3.4–5)
AST SERPL-CCNC: 23 U/L (ref 15–37)
BASOPHILS # BLD AUTO: 0.02 X10(3) UL (ref 0–0.2)
BASOPHILS NFR BLD AUTO: 0.4 %
CREAT BLD-MCNC: 1.06 MG/DL
CRP SERPL-MCNC: <0.29 MG/DL (ref ?–0.3)
DEPRECATED RDW RBC AUTO: 48.5 FL (ref 35.1–46.3)
EOSINOPHIL # BLD AUTO: 0.12 X10(3) UL (ref 0–0.7)
EOSINOPHIL NFR BLD AUTO: 2.5 %
ERYTHROCYTE [DISTWIDTH] IN BLOOD BY AUTOMATED COUNT: 12.4 % (ref 11–15)
ERYTHROCYTE [SEDIMENTATION RATE] IN BLOOD: 33 MM/HR
HCT VFR BLD AUTO: 33.3 %
HGB BLD-MCNC: 10.9 G/DL
IMM GRANULOCYTES # BLD AUTO: 0.01 X10(3) UL (ref 0–1)
IMM GRANULOCYTES NFR BLD: 0.2 %
LYMPHOCYTES # BLD AUTO: 0.97 X10(3) UL (ref 1–4)
LYMPHOCYTES NFR BLD AUTO: 20.6 %
MCH RBC QN AUTO: 34.4 PG (ref 26–34)
MCHC RBC AUTO-ENTMCNC: 32.7 G/DL (ref 31–37)
MCV RBC AUTO: 105 FL
MONOCYTES # BLD AUTO: 0.54 X10(3) UL (ref 0.1–1)
MONOCYTES NFR BLD AUTO: 11.4 %
NEUTROPHILS # BLD AUTO: 3.06 X10 (3) UL (ref 1.5–7.7)
NEUTROPHILS # BLD AUTO: 3.06 X10(3) UL (ref 1.5–7.7)
NEUTROPHILS NFR BLD AUTO: 64.9 %
PLATELET # BLD AUTO: 188 10(3)UL (ref 150–450)
RBC # BLD AUTO: 3.17 X10(6)UL
WBC # BLD AUTO: 4.7 X10(3) UL (ref 4–11)

## 2022-01-17 PROCEDURE — 86140 C-REACTIVE PROTEIN: CPT

## 2022-01-17 PROCEDURE — 82565 ASSAY OF CREATININE: CPT

## 2022-01-17 PROCEDURE — 84450 TRANSFERASE (AST) (SGOT): CPT

## 2022-01-17 PROCEDURE — 36415 COLL VENOUS BLD VENIPUNCTURE: CPT

## 2022-01-17 PROCEDURE — 84443 ASSAY THYROID STIM HORMONE: CPT

## 2022-01-17 PROCEDURE — 83036 HEMOGLOBIN GLYCOSYLATED A1C: CPT

## 2022-01-17 PROCEDURE — 85025 COMPLETE CBC W/AUTO DIFF WBC: CPT

## 2022-01-17 PROCEDURE — 82040 ASSAY OF SERUM ALBUMIN: CPT

## 2022-01-17 PROCEDURE — 85652 RBC SED RATE AUTOMATED: CPT

## 2022-01-18 ENCOUNTER — OFFICE VISIT (OUTPATIENT)
Dept: RHEUMATOLOGY | Facility: CLINIC | Age: 87
End: 2022-01-18
Payer: MEDICARE

## 2022-01-18 VITALS
SYSTOLIC BLOOD PRESSURE: 127 MMHG | WEIGHT: 108 LBS | DIASTOLIC BLOOD PRESSURE: 67 MMHG | HEART RATE: 88 BPM | HEIGHT: 64 IN | BODY MASS INDEX: 18.44 KG/M2

## 2022-01-18 DIAGNOSIS — R73.9 HYPERGLYCEMIA: Primary | ICD-10-CM

## 2022-01-18 DIAGNOSIS — Z51.81 ENCOUNTER FOR THERAPEUTIC DRUG MONITORING: ICD-10-CM

## 2022-01-18 DIAGNOSIS — G62.9 SENSORY NEUROPATHY: ICD-10-CM

## 2022-01-18 DIAGNOSIS — M05.79 RHEUMATOID ARTHRITIS INVOLVING MULTIPLE SITES WITH POSITIVE RHEUMATOID FACTOR (HCC): ICD-10-CM

## 2022-01-18 LAB
EST. AVERAGE GLUCOSE BLD GHB EST-MCNC: 97 MG/DL (ref 68–126)
HBA1C MFR BLD: 5 % (ref ?–5.7)
TSI SER-ACNC: 5.54 MIU/ML (ref 0.36–3.74)

## 2022-01-18 PROCEDURE — 99214 OFFICE O/P EST MOD 30 MIN: CPT | Performed by: INTERNAL MEDICINE

## 2022-01-18 RX ORDER — PREDNISONE 1 MG/1
TABLET ORAL
Qty: 360 TABLET | Refills: 1 | Status: SHIPPED | OUTPATIENT
Start: 2022-01-18

## 2022-01-18 RX ORDER — FOLIC ACID 1 MG/1
2 TABLET ORAL DAILY
Qty: 180 TABLET | Refills: 3 | Status: SHIPPED | OUTPATIENT
Start: 2022-01-18

## 2022-01-18 RX ORDER — SULFASALAZINE 500 MG/1
TABLET ORAL
Qty: 540 TABLET | Refills: 1 | Status: SHIPPED | OUTPATIENT
Start: 2022-01-18

## 2022-01-20 ENCOUNTER — TELEPHONE (OUTPATIENT)
Dept: RHEUMATOLOGY | Facility: CLINIC | Age: 87
End: 2022-01-20

## 2022-01-20 NOTE — TELEPHONE ENCOUNTER
Please let her know that her hemoglobin A1c is normal.  She does not have diabetes, to explain her foot numbness and tingling. Her TSH is elevated, so she appears to have hypothyroidism. Hypothyroidism can cause weakness and fatigue.     She needs to es

## 2022-01-20 NOTE — TELEPHONE ENCOUNTER
Spoke to patient, name and  verified. She was informed of Dr. Lilliana Murdock note. She verbalized understanding and will find a PCP.

## 2022-05-23 RX ORDER — SULFASALAZINE 500 MG/1
TABLET ORAL
Qty: 540 TABLET | Refills: 0 | Status: SHIPPED | OUTPATIENT
Start: 2022-05-23 | End: 2022-11-08

## 2022-05-23 NOTE — TELEPHONE ENCOUNTER
LOV:1/18/22   Last Refilled:#540, 1rf 1/18/22  Labs:AST 23  1/17/22  No future appointments. Please advise.

## 2022-06-29 NOTE — PROGRESS NOTES
Phil Strickland is a delightful 81-year-old woman, who I last saw January 18th of 2022, with CCP and RF positive rheumatoid arthritis. Since, she has continued  prednisone 4 mg daily, and sulfasalazine 1500 mg twice a day. Her RA remains much improved. She denies swelling across her wrists and hand MCP joints in the AM, like in the past.  Her hands are getting weaker. Her DIP joints are subluxing. Her right fingers are subluxing ulnarly. In the past, she stopped leflunomide because of hair thinning, 20mg daily for 4 weeks. On methotrexate, her AST yeimy to 93, and she had more stomach discomfort and indigestion, so it was discontinued. She has been treated for H Pylori gastritis in the past.  She has a long history of abdominal discomfort after eating. She has nausea. This continues. She continues to feel weak and fatigued. Her breathing is okay. She has worsening numbness and tingling of both feet, which can affect her walking. Labs were repeated July 1st of 2022. Her CBC showed improved hemoglobin and hematocrit of 10.9 and 34.6. Creatinine stable at 1.18 (42). Sed rate just within normal limits at 38 (0-42). C-reactive protein normal less than 0.29. AST and albumin normal.  B12 normal.    Back in January of 2022, her TSH had been elevated. She never followed up. She does not have a primary care physician. Her stomach remains unsettled. She has more numbness and tingling of her feet. She has seen pulmonary. She has areas of pulmonary consolidation predominantly in the upper and middle lobes. There was a pleural-based density in the anterior left upper lung field, making empyema difficult to exclude. There were areas of focal pulmonary parenchymal nodular densities bilaterally. Borderline to mildly enlarged lymph nodes in the mediastinum. A PET scan was done, and she states was negative for malignancy.  She smoked more than 1 pack of day of cigarettes until giving him up 40 years ago.  She has chronic shortness of breath, which is stable. She refused bronchoscopy, so there is no definite diagnosis. Therefore, Merril Pilling was not started. Review of Systems   Constitutional: Positive for fatigue. Negative for chills and fever. Respiratory: Negative for shortness of breath. Cardiovascular: Negative for chest pain. Gastrointestinal: Positive for occasional abdominal pain and nausea. Skin: Negative for rash. Hematological: Negative for adenopathy. Allergies:No Known Allergies     PHYSICAL EXAM:   Blood pressure 114/65, pulse 85, height 5' 4\" (1.626 m), weight 104 lb (47.2 kg). Constitutional: She is oriented to person, place, and time. She appears well-developed. Thin. HENT:   Head: Normocephalic. Eyes: Conjunctivae are normal.   Cardiovascular: Normal rate, regular rhythm and normal heart sounds. Pulmonary/Chest: Effort normal and breath sounds normal.   Abdominal: Soft. Bowel sounds are normal. She exhibits no mass. There is tenderness. There is no rebound and no guarding. Musculoskeletal: She exhibits no edema. There is not soft tissue swelling across her wrists and hand MCP joints.  strength is good. Shoulders and hips move freely. Her right fingers are subluxing ulnarly. Deformity of both palms. Lymphadenopathy:     She has no cervical adenopathy. Neurological: She is alert and oriented to person, place, and time. Decreased fine touch sensation over her feet. Skin: No rash over her arms and legs. Psychiatric: She has a normal mood and affect. ASSESSMENT/PLAN:     1. CCP and RF positive rheumatoid arthritis. Doing pretty well. She will continue prednisone 4 mg a day, and sulfasalazine 1500mg twice daily. Her Rxs were refillled. She will schedule back in 3  months, after repeat labs. 2. Renal insufficiency. Chronic. Stay hydrated. 3. Anemia. Renal insufficiency and active RA. 4. H/o H Pylori. Continued stomach symptoms. Nausea.   She promises to schedule an appointment with a new primary care provider, and have this worked up. 5.  Hair thinning. She will continue biotin 5 mg a day over-the-counter. Leflunomide d/yin long ago. 6.  Unresolved lung pathology. Her symptoms are stable. She refused bronchoscopy for diagnosis. History of cigarette smoking. She promises to schedule an appointment with a new primary care provider. 7.  Fatigue. Her TSH was elevated months ago. TSH, free T3, and free T4 levels will be checked today. She needs to schedule an appointment with a new primary care provider. 8.  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

## 2022-07-01 ENCOUNTER — LAB ENCOUNTER (OUTPATIENT)
Dept: LAB | Age: 87
End: 2022-07-01
Attending: INTERNAL MEDICINE
Payer: MEDICARE

## 2022-07-01 ENCOUNTER — TELEPHONE (OUTPATIENT)
Dept: RHEUMATOLOGY | Facility: CLINIC | Age: 87
End: 2022-07-01

## 2022-07-01 DIAGNOSIS — M05.79 RHEUMATOID ARTHRITIS INVOLVING MULTIPLE SITES WITH POSITIVE RHEUMATOID FACTOR (HCC): ICD-10-CM

## 2022-07-01 DIAGNOSIS — G62.9 SENSORY NEUROPATHY: ICD-10-CM

## 2022-07-01 DIAGNOSIS — Z51.81 ENCOUNTER FOR THERAPEUTIC DRUG MONITORING: Primary | ICD-10-CM

## 2022-07-01 LAB
ALBUMIN SERPL-MCNC: 3.4 G/DL (ref 3.4–5)
AST SERPL-CCNC: 27 U/L (ref 15–37)
BASOPHILS # BLD AUTO: 0.03 X10(3) UL (ref 0–0.2)
BASOPHILS NFR BLD AUTO: 0.6 %
CREAT BLD-MCNC: 1.18 MG/DL
CRP SERPL-MCNC: <0.29 MG/DL (ref ?–0.3)
DEPRECATED RDW RBC AUTO: 51.4 FL (ref 35.1–46.3)
EOSINOPHIL # BLD AUTO: 0.1 X10(3) UL (ref 0–0.7)
EOSINOPHIL NFR BLD AUTO: 2.1 %
ERYTHROCYTE [DISTWIDTH] IN BLOOD BY AUTOMATED COUNT: 13 % (ref 11–15)
ERYTHROCYTE [SEDIMENTATION RATE] IN BLOOD: 38 MM/HR
HCT VFR BLD AUTO: 34.6 %
HGB BLD-MCNC: 10.9 G/DL
IMM GRANULOCYTES # BLD AUTO: 0.01 X10(3) UL (ref 0–1)
IMM GRANULOCYTES NFR BLD: 0.2 %
LYMPHOCYTES # BLD AUTO: 0.97 X10(3) UL (ref 1–4)
LYMPHOCYTES NFR BLD AUTO: 20.1 %
MCH RBC QN AUTO: 33.4 PG (ref 26–34)
MCHC RBC AUTO-ENTMCNC: 31.5 G/DL (ref 31–37)
MCV RBC AUTO: 106.1 FL
MONOCYTES # BLD AUTO: 0.52 X10(3) UL (ref 0.1–1)
MONOCYTES NFR BLD AUTO: 10.8 %
NEUTROPHILS # BLD AUTO: 3.2 X10 (3) UL (ref 1.5–7.7)
NEUTROPHILS # BLD AUTO: 3.2 X10(3) UL (ref 1.5–7.7)
NEUTROPHILS NFR BLD AUTO: 66.2 %
PLATELET # BLD AUTO: 204 10(3)UL (ref 150–450)
RBC # BLD AUTO: 3.26 X10(6)UL
VIT B12 SERPL-MCNC: 237 PG/ML (ref 193–986)
WBC # BLD AUTO: 4.8 X10(3) UL (ref 4–11)

## 2022-07-01 PROCEDURE — 82607 VITAMIN B-12: CPT

## 2022-07-05 ENCOUNTER — OFFICE VISIT (OUTPATIENT)
Dept: RHEUMATOLOGY | Facility: CLINIC | Age: 87
End: 2022-07-05
Payer: MEDICARE

## 2022-07-05 ENCOUNTER — LAB ENCOUNTER (OUTPATIENT)
Dept: LAB | Age: 87
End: 2022-07-05
Attending: INTERNAL MEDICINE
Payer: MEDICARE

## 2022-07-05 VITALS
HEIGHT: 64 IN | HEART RATE: 85 BPM | DIASTOLIC BLOOD PRESSURE: 65 MMHG | WEIGHT: 104 LBS | SYSTOLIC BLOOD PRESSURE: 114 MMHG | BODY MASS INDEX: 17.75 KG/M2

## 2022-07-05 DIAGNOSIS — M05.79 RHEUMATOID ARTHRITIS INVOLVING MULTIPLE SITES WITH POSITIVE RHEUMATOID FACTOR (HCC): Primary | ICD-10-CM

## 2022-07-05 DIAGNOSIS — G62.9 SENSORY NEUROPATHY: ICD-10-CM

## 2022-07-05 DIAGNOSIS — E03.9 HYPOTHYROIDISM, UNSPECIFIED TYPE: ICD-10-CM

## 2022-07-05 DIAGNOSIS — G62.9 PERIPHERAL POLYNEUROPATHY: ICD-10-CM

## 2022-07-05 DIAGNOSIS — Z51.81 ENCOUNTER FOR THERAPEUTIC DRUG MONITORING: ICD-10-CM

## 2022-07-05 LAB
T3FREE SERPL-MCNC: 2.84 PG/ML (ref 2.4–4.2)
T4 FREE SERPL-MCNC: 1.1 NG/DL (ref 0.8–1.7)
TSI SER-ACNC: 3.44 MIU/ML (ref 0.36–3.74)

## 2022-07-05 PROCEDURE — 99214 OFFICE O/P EST MOD 30 MIN: CPT | Performed by: INTERNAL MEDICINE

## 2022-07-05 PROCEDURE — 84443 ASSAY THYROID STIM HORMONE: CPT

## 2022-07-05 PROCEDURE — 84439 ASSAY OF FREE THYROXINE: CPT

## 2022-07-05 PROCEDURE — 36415 COLL VENOUS BLD VENIPUNCTURE: CPT

## 2022-07-05 PROCEDURE — 84481 FREE ASSAY (FT-3): CPT

## 2022-07-05 RX ORDER — PREDNISONE 1 MG/1
TABLET ORAL
Qty: 360 TABLET | Refills: 1 | Status: SHIPPED | OUTPATIENT
Start: 2022-07-05

## 2022-10-07 RX ORDER — PREDNISONE 1 MG/1
TABLET ORAL
Qty: 360 TABLET | Refills: 1 | OUTPATIENT
Start: 2022-10-07

## 2022-10-27 RX ORDER — PREDNISONE 1 MG/1
TABLET ORAL
Qty: 360 TABLET | Refills: 1 | Status: SHIPPED | OUTPATIENT
Start: 2022-10-27

## 2022-11-03 NOTE — PROGRESS NOTES
Nikkie Ruiz is a delightful 27-year-old woman, who I last saw July 5th of 2022, with CCP and RF positive rheumatoid arthritis. Since, she has continued  prednisone 4 mg daily, and sulfasalazine 1500 mg twice a day. Her RA remains much improved. She denies swelling across her wrists and hand MCP joints in the AM, like in the past.  Her hands are getting weaker, and it's harder to do fine tasks. Her DIP joints are subluxing. Her right fingers are subluxing ulnarly. In the past, she stopped leflunomide because of hair thinning, 20mg daily for 4 weeks. On methotrexate, her AST yeimy to 93, and she had more stomach discomfort and indigestion, so it was discontinued. She has been treated for H Pylori gastritis in the past.  She has a long history of abdominal discomfort after eating. She has nausea. This continues. She continues to feel weak and fatigued. Her breathing is okay. She has worsening numbness and tingling of both feet, which can affect her walking and driving. Her short-term memory is not as good    Labs were repeated November 7th of 2022. Her CBC showed stable hemoglobin and hematocrit of 11.3 and 34.4. Creatinine stable at 1.12 (48). Sed rate elevated at 77 (0-42). C-reactive protein borderline at 0.48. AST and albumin normal.     She still does not have a primary care physician. Her stomach remains unsettled. She has seen pulmonary. She has areas of pulmonary consolidation predominantly in the upper and middle lobes. There was a pleural-based density in the anterior left upper lung field, making empyema difficult to exclude. There were areas of focal pulmonary parenchymal nodular densities bilaterally. Borderline to mildly enlarged lymph nodes in the mediastinum. A PET scan was done, and she states was negative for malignancy. She smoked more than 1 pack of day of cigarettes until giving him up 40 years ago. She has chronic shortness of breath, which is stable.   She refused bronchoscopy, so there is no definite diagnosis. Therefore, Ever Victoria was not started. Review of Systems   Constitutional: Positive for fatigue. Negative for chills and fever. Respiratory: Negative for shortness of breath. Cardiovascular: Negative for chest pain. Gastrointestinal: Positive for occasional abdominal pain and nausea. Skin: Negative for rash. Hematological: Negative for adenopathy. Allergies:No Known Allergies     PHYSICAL EXAM:   Blood pressure 123/65, pulse 89, height 5' 4\" (1.626 m), weight 105 lb (47.6 kg). Constitutional: She is oriented to person, place, and time. She appears well-developed. Thin. HENT:   Head: Normocephalic. Eyes: Conjunctivae are normal.   Cardiovascular: Normal rate, regular rhythm and normal heart sounds. Pulmonary/Chest: Effort normal and breath sounds normal.   Abdominal: Soft. Bowel sounds are normal. She exhibits no mass. There is tenderness. There is no rebound and no guarding. Musculoskeletal: She exhibits no edema. There is not soft tissue swelling across her wrists and hand MCP joints.  strength is good. Shoulders and hips move freely. Her right fingers are subluxing ulnarly. Deformity of both palms. Lymphadenopathy:     She has no cervical adenopathy. Neurological: She is alert and oriented to person, place, and time. Decreased fine touch sensation over her feet. Skin: No rash over her arms and legs. Psychiatric: She has a normal mood and affect. ASSESSMENT/PLAN:     1. CCP and RF positive rheumatoid arthritis. Doing pretty well. She will continue prednisone 4 mg a day, and sulfasalazine 1500mg twice daily. Her Rxs were refillled. She will schedule back in 3  months, after repeat labs. 2. Renal insufficiency. Chronic. Stay hydrated. 3. Anemia. Renal insufficiency and active RA. 4. H/o H Pylori. Continued stomach symptoms. Nausea.   She promises to schedule an appointment with a new primary care provider, and have this worked up. 5.  Hair thinning. She will continue biotin 5 mg a day over-the-counter. Leflunomide d/yin long ago. 6.  Unresolved lung pathology. Her symptoms are stable. She refused bronchoscopy for diagnosis. History of cigarette smoking. She promises to schedule an appointment with a new primary care provider. 7.  Fatigue. TSH, free T3, and free T4 normal 3 months ago. She needs to schedule an appointment with a new primary care provider. 8.  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

## 2022-11-07 ENCOUNTER — LAB ENCOUNTER (OUTPATIENT)
Dept: LAB | Age: 87
End: 2022-11-07
Attending: INTERNAL MEDICINE
Payer: MEDICARE

## 2022-11-07 DIAGNOSIS — M05.79 RHEUMATOID ARTHRITIS INVOLVING MULTIPLE SITES WITH POSITIVE RHEUMATOID FACTOR (HCC): ICD-10-CM

## 2022-11-07 DIAGNOSIS — Z51.81 ENCOUNTER FOR THERAPEUTIC DRUG MONITORING: ICD-10-CM

## 2022-11-07 LAB
ALBUMIN SERPL-MCNC: 3.4 G/DL (ref 3.4–5)
AST SERPL-CCNC: 25 U/L (ref 15–37)
BASOPHILS # BLD AUTO: 0.03 X10(3) UL (ref 0–0.2)
BASOPHILS NFR BLD AUTO: 0.6 %
CREAT BLD-MCNC: 1.12 MG/DL
CRP SERPL-MCNC: 0.48 MG/DL (ref ?–0.3)
DEPRECATED RDW RBC AUTO: 47.7 FL (ref 35.1–46.3)
EOSINOPHIL # BLD AUTO: 0.11 X10(3) UL (ref 0–0.7)
EOSINOPHIL NFR BLD AUTO: 2.4 %
ERYTHROCYTE [DISTWIDTH] IN BLOOD BY AUTOMATED COUNT: 12.5 % (ref 11–15)
ERYTHROCYTE [SEDIMENTATION RATE] IN BLOOD: 77 MM/HR
GFR SERPLBLD BASED ON 1.73 SQ M-ARVRAT: 48 ML/MIN/1.73M2 (ref 60–?)
HCT VFR BLD AUTO: 34.4 %
HGB BLD-MCNC: 11.3 G/DL
IMM GRANULOCYTES # BLD AUTO: 0.01 X10(3) UL (ref 0–1)
IMM GRANULOCYTES NFR BLD: 0.2 %
LYMPHOCYTES # BLD AUTO: 0.95 X10(3) UL (ref 1–4)
LYMPHOCYTES NFR BLD AUTO: 20.3 %
MCH RBC QN AUTO: 34.1 PG (ref 26–34)
MCHC RBC AUTO-ENTMCNC: 32.8 G/DL (ref 31–37)
MCV RBC AUTO: 103.9 FL
MONOCYTES # BLD AUTO: 0.5 X10(3) UL (ref 0.1–1)
MONOCYTES NFR BLD AUTO: 10.7 %
NEUTROPHILS # BLD AUTO: 3.08 X10 (3) UL (ref 1.5–7.7)
NEUTROPHILS # BLD AUTO: 3.08 X10(3) UL (ref 1.5–7.7)
NEUTROPHILS NFR BLD AUTO: 65.8 %
PLATELET # BLD AUTO: 233 10(3)UL (ref 150–450)
RBC # BLD AUTO: 3.31 X10(6)UL
WBC # BLD AUTO: 4.7 X10(3) UL (ref 4–11)

## 2022-11-07 PROCEDURE — 82565 ASSAY OF CREATININE: CPT

## 2022-11-07 PROCEDURE — 85025 COMPLETE CBC W/AUTO DIFF WBC: CPT

## 2022-11-07 PROCEDURE — 36415 COLL VENOUS BLD VENIPUNCTURE: CPT

## 2022-11-07 PROCEDURE — 86140 C-REACTIVE PROTEIN: CPT

## 2022-11-07 PROCEDURE — 84450 TRANSFERASE (AST) (SGOT): CPT

## 2022-11-07 PROCEDURE — 85652 RBC SED RATE AUTOMATED: CPT

## 2022-11-07 PROCEDURE — 82040 ASSAY OF SERUM ALBUMIN: CPT

## 2022-11-08 ENCOUNTER — OFFICE VISIT (OUTPATIENT)
Dept: RHEUMATOLOGY | Facility: CLINIC | Age: 87
End: 2022-11-08
Payer: MEDICARE

## 2022-11-08 VITALS
SYSTOLIC BLOOD PRESSURE: 123 MMHG | HEIGHT: 64 IN | WEIGHT: 105 LBS | BODY MASS INDEX: 17.93 KG/M2 | DIASTOLIC BLOOD PRESSURE: 65 MMHG | HEART RATE: 89 BPM

## 2022-11-08 DIAGNOSIS — Z51.81 ENCOUNTER FOR THERAPEUTIC DRUG MONITORING: ICD-10-CM

## 2022-11-08 DIAGNOSIS — G62.9 PERIPHERAL POLYNEUROPATHY: ICD-10-CM

## 2022-11-08 DIAGNOSIS — M05.79 RHEUMATOID ARTHRITIS INVOLVING MULTIPLE SITES WITH POSITIVE RHEUMATOID FACTOR (HCC): Primary | ICD-10-CM

## 2022-11-08 PROCEDURE — 99214 OFFICE O/P EST MOD 30 MIN: CPT | Performed by: INTERNAL MEDICINE

## 2022-11-08 RX ORDER — SULFASALAZINE 500 MG/1
TABLET ORAL
Qty: 540 TABLET | Refills: 1 | Status: SHIPPED | OUTPATIENT
Start: 2022-11-08

## 2023-02-09 RX ORDER — PREDNISONE 1 MG/1
TABLET ORAL
Qty: 360 TABLET | Refills: 1 | Status: SHIPPED | OUTPATIENT
Start: 2023-02-09

## 2023-02-09 NOTE — TELEPHONE ENCOUNTER
Disp Refills Start End    predniSONE 1 MG Oral Tab 360 tablet 1 10/27/2022      LOV: 11/8/22  No future appointments.   Labs:   Component      Latest Ref Rng & Units 11/7/2022   WBC      4.0 - 11.0 x10(3) uL 4.7   RBC      3.80 - 5.30 x10(6)uL 3.31 (L)   Hemoglobin      12.0 - 16.0 g/dL 11.3 (L)   Hematocrit      35.0 - 48.0 % 34.4 (L)   MCV      80.0 - 100.0 fL 103.9 (H)   MCH      26.0 - 34.0 pg 34.1 (H)   MCHC      31.0 - 37.0 g/dL 32.8   RDW-SD      35.1 - 46.3 fL 47.7 (H)   RDW      11.0 - 15.0 % 12.5   Platelet Count      267.1 - 450.0 10(3)uL 233.0   Prelim Neutrophil Abs      1.50 - 7.70 x10 (3) uL 3.08   Neutrophils Absolute      1.50 - 7.70 x10(3) uL 3.08   Lymphocytes Absolute      1.00 - 4.00 x10(3) uL 0.95 (L)   Monocytes Absolute      0.10 - 1.00 x10(3) uL 0.50   Eosinophils Absolute      0.00 - 0.70 x10(3) uL 0.11   Basophils Absolute      0.00 - 0.20 x10(3) uL 0.03   Immature Granulocyte Absolute      0.00 - 1.00 x10(3) uL 0.01   Neutrophils %      % 65.8   Lymphocytes %      % 20.3   Monocytes %      % 10.7   Eosinophils %      % 2.4   Basophils %      % 0.6   Immature Granulocyte %      % 0.2   CREATININE      0.55 - 1.02 mg/dL 1.12 (H)   eGFR-Cr      >=60 mL/min/1.73m2 48 (L)   SED RATE      0 - 30 mm/Hr 77 (H)   C-REACTIVE PROTEIN      <0.30 mg/dL 0.48 (H)   AST (SGOT)      15 - 37 U/L 25   Albumin      3.4 - 5.0 g/dL 3.4

## 2023-03-01 ENCOUNTER — TELEPHONE (OUTPATIENT)
Dept: RHEUMATOLOGY | Facility: CLINIC | Age: 88
End: 2023-03-01

## 2023-03-01 RX ORDER — NIRMATRELVIR AND RITONAVIR 300-100 MG
KIT ORAL
Qty: 30 TABLET | Refills: 0 | Status: SHIPPED | OUTPATIENT
Start: 2023-03-01 | End: 2023-03-06

## 2023-03-01 NOTE — TELEPHONE ENCOUNTER
Patients daughter calling to inform that patient has just tested positive for Covid, nurse line not available at time of call, please call back [phone], daughter is with patient

## 2023-03-01 NOTE — TELEPHONE ENCOUNTER
Spoke with patient and daughter. Patient was exposed to covid recently. Tested positive at home today. Stated she feels run down and has the sniffles. Otherwise feeling okay at this time. Denied fever or shortness of breath. Advise to see emergency care if she develops shortness of breath. They would like to know if she should get early covid treatment such as Paxlovid due to her age. Wanted provider to be aware of what is going on. Does not have a PCP at this time. Stated Dr. Ramez Arguelles is it. [facility] pharmacy on file is preferred.

## 2023-04-26 RX ORDER — FOLIC ACID 1 MG/1
TABLET ORAL
Qty: 180 TABLET | Refills: 3 | Status: SHIPPED | OUTPATIENT
Start: 2023-04-26

## 2023-06-23 DIAGNOSIS — M05.79 RHEUMATOID ARTHRITIS INVOLVING MULTIPLE SITES WITH POSITIVE RHEUMATOID FACTOR (HCC): Primary | ICD-10-CM

## 2023-06-23 DIAGNOSIS — Z51.81 ENCOUNTER FOR THERAPEUTIC DRUG MONITORING: ICD-10-CM

## 2023-09-07 ENCOUNTER — OFFICE VISIT (OUTPATIENT)
Dept: RHEUMATOLOGY | Facility: CLINIC | Age: 88
End: 2023-09-07

## 2023-09-07 VITALS
SYSTOLIC BLOOD PRESSURE: 145 MMHG | HEIGHT: 64 IN | DIASTOLIC BLOOD PRESSURE: 66 MMHG | BODY MASS INDEX: 16.56 KG/M2 | WEIGHT: 97 LBS | HEART RATE: 74 BPM

## 2023-09-07 DIAGNOSIS — Z51.81 ENCOUNTER FOR THERAPEUTIC DRUG MONITORING: ICD-10-CM

## 2023-09-07 DIAGNOSIS — M05.79 RHEUMATOID ARTHRITIS INVOLVING MULTIPLE SITES WITH POSITIVE RHEUMATOID FACTOR (HCC): Primary | ICD-10-CM

## 2023-09-07 PROCEDURE — 99214 OFFICE O/P EST MOD 30 MIN: CPT | Performed by: INTERNAL MEDICINE

## 2023-09-07 RX ORDER — ALENDRONATE SODIUM 70 MG/1
70 TABLET ORAL WEEKLY
COMMUNITY
Start: 2023-08-18

## 2023-09-07 RX ORDER — PREDNISONE 1 MG/1
TABLET ORAL
Qty: 360 TABLET | Refills: 1 | Status: SHIPPED | OUTPATIENT
Start: 2023-09-07

## 2023-09-07 RX ORDER — SULFASALAZINE 500 MG/1
TABLET ORAL
Qty: 540 TABLET | Refills: 1 | Status: SHIPPED | OUTPATIENT
Start: 2023-09-07

## 2023-09-07 NOTE — PATIENT INSTRUCTIONS
You are seen for rheumatoid arthritis  Joints are stable  Continue sulfasalazine 3 pills in the morning and 3 pills at night  Also continue prednisone 4 mg daily  Blood work in 4 months, see me in 4 months  You also have osteopenia which is some mild bone loss.   Take calcium 600 mg daily and released vitamin D 2000 units daily

## 2024-01-01 ENCOUNTER — HOSPITAL ENCOUNTER (INPATIENT)
Age: 89
LOS: 2 days | DRG: 951 | End: 2024-03-02
Attending: HOSPITALIST | Admitting: HOSPITALIST

## 2024-01-01 ENCOUNTER — APPOINTMENT (OUTPATIENT)
Dept: ULTRASOUND IMAGING | Age: 89
DRG: 871 | End: 2024-01-01
Attending: INTERNAL MEDICINE

## 2024-01-01 ENCOUNTER — APPOINTMENT (OUTPATIENT)
Dept: GENERAL RADIOLOGY | Age: 89
DRG: 871 | End: 2024-01-01
Attending: INTERNAL MEDICINE

## 2024-01-01 ENCOUNTER — HOSPITAL ENCOUNTER (INPATIENT)
Age: 89
LOS: 4 days | Discharge: HOSPICE | DRG: 871 | End: 2024-02-29
Attending: EMERGENCY MEDICINE | Admitting: HOSPITALIST

## 2024-01-01 ENCOUNTER — APPOINTMENT (OUTPATIENT)
Dept: CT IMAGING | Age: 89
DRG: 871 | End: 2024-01-01
Attending: EMERGENCY MEDICINE

## 2024-01-01 ENCOUNTER — APPOINTMENT (OUTPATIENT)
Dept: CARDIOLOGY | Age: 89
DRG: 871 | End: 2024-01-01
Attending: INTERNAL MEDICINE

## 2024-01-01 ENCOUNTER — APPOINTMENT (OUTPATIENT)
Dept: GENERAL RADIOLOGY | Age: 89
DRG: 871 | End: 2024-01-01
Attending: EMERGENCY MEDICINE

## 2024-01-01 VITALS
BODY MASS INDEX: 16.86 KG/M2 | HEART RATE: 93 BPM | RESPIRATION RATE: 16 BRPM | DIASTOLIC BLOOD PRESSURE: 61 MMHG | HEIGHT: 64 IN | WEIGHT: 98.77 LBS | TEMPERATURE: 97.7 F | SYSTOLIC BLOOD PRESSURE: 125 MMHG | OXYGEN SATURATION: 97 %

## 2024-01-01 VITALS
SYSTOLIC BLOOD PRESSURE: 93 MMHG | DIASTOLIC BLOOD PRESSURE: 50 MMHG | WEIGHT: 98.77 LBS | HEART RATE: 90 BPM | OXYGEN SATURATION: 95 % | HEIGHT: 64 IN | TEMPERATURE: 96.3 F | RESPIRATION RATE: 10 BRPM | BODY MASS INDEX: 16.86 KG/M2

## 2024-01-01 DIAGNOSIS — I50.9 HEART FAILURE, UNSPECIFIED HF CHRONICITY, UNSPECIFIED HEART FAILURE TYPE (CMD): ICD-10-CM

## 2024-01-01 DIAGNOSIS — J18.9 PNEUMONIA OF LEFT LUNG DUE TO INFECTIOUS ORGANISM, UNSPECIFIED PART OF LUNG: ICD-10-CM

## 2024-01-01 DIAGNOSIS — I26.99 OTHER ACUTE PULMONARY EMBOLISM WITHOUT ACUTE COR PULMONALE (CMD): ICD-10-CM

## 2024-01-01 DIAGNOSIS — J96.01 ACUTE RESPIRATORY FAILURE WITH HYPOXIA (CMD): Primary | ICD-10-CM

## 2024-01-01 DIAGNOSIS — J81.0 ACUTE PULMONARY EDEMA (CMD): ICD-10-CM

## 2024-01-01 LAB
A BAUMANNII DNA BLD POS QL NAA+NON-PROBE: NOT DETECTED
A BAUMANNII DNA BLD POS QL NAA+NON-PROBE: NOT DETECTED
ALBUMIN SERPL-MCNC: 1.6 G/DL (ref 3.6–5.1)
ALBUMIN SERPL-MCNC: 1.9 G/DL (ref 3.6–5.1)
ALBUMIN/GLOB SERPL: 0.3 {RATIO} (ref 1–2.4)
ALBUMIN/GLOB SERPL: 0.3 {RATIO} (ref 1–2.4)
ALP SERPL-CCNC: 80 UNITS/L (ref 45–117)
ALP SERPL-CCNC: 91 UNITS/L (ref 45–117)
ALT SERPL-CCNC: 11 UNITS/L
ALT SERPL-CCNC: 12 UNITS/L
ANION GAP SERPL CALC-SCNC: 7 MMOL/L (ref 7–19)
ANION GAP SERPL CALC-SCNC: 9 MMOL/L (ref 7–19)
AORTIC VALVE AREA (AVA): 1.08
AORTIC VALVE AREA: 2.24
APPEARANCE UR: ABNORMAL
APTT PPP: 25 SEC (ref 22–32)
APTT PPP: 34 SEC (ref 22–32)
APTT PPP: 34 SEC (ref 22–32)
APTT PPP: 36 SEC (ref 22–32)
APTT PPP: 40 SEC (ref 22–32)
APTT PPP: 78 SEC (ref 22–32)
AST SERPL-CCNC: 25 UNITS/L
AST SERPL-CCNC: 26 UNITS/L
ATRIAL RATE (BPM): 92
ATRIAL RATE (BPM): 98
AV MEAN GRADIENT (AVMG): 3
AV MEAN VELOCITY (AVMV): 0.77
AV PEAK GRADIENT (AVPG): 4
AV PEAK VELOCITY (AVPV): 1.06
AV STENOSIS SEVERITY TEXT: NORMAL
AVI LVOT PEAK GRADIENT (LVOTMG): 1.1
B FRAGILIS DNA BLD POS QL NAA+NON-PROBE: NOT DETECTED
B FRAGILIS DNA BLD POS QL NAA+NON-PROBE: NOT DETECTED
BACTERIA #/AREA URNS HPF: ABNORMAL /HPF
BACTERIA BLD CULT: ABNORMAL
BACTERIA BLD CULT: ABNORMAL
BACTERIA SPT AEROBE CULT: ABNORMAL
BACTERIA UR CULT: ABNORMAL
BASE EXCESS / DEFICIT, ARTERIAL - RESPIRATORY: 1 MMOL/L (ref -2–3)
BASOPHILS # BLD: 0.1 K/MCL (ref 0–0.3)
BASOPHILS NFR BLD: 0 %
BDY SITE: ABNORMAL
BILIRUB SERPL-MCNC: 0.3 MG/DL (ref 0.2–1)
BILIRUB SERPL-MCNC: 0.5 MG/DL (ref 0.2–1)
BILIRUB UR QL STRIP: NEGATIVE
BODY TEMPERATURE: 37 DEGREES C
BUN SERPL-MCNC: 54 MG/DL (ref 6–20)
BUN SERPL-MCNC: 58 MG/DL (ref 6–20)
BUN/CREAT SERPL: 39 (ref 7–25)
BUN/CREAT SERPL: 45 (ref 7–25)
C ALBICANS DNA BLD POS QL NAA+NON-PROBE: NOT DETECTED
C ALBICANS DNA BLD POS QL NAA+NON-PROBE: NOT DETECTED
C AURIS DNA BLD POS QL NAA+NON-PROBE: NOT DETECTED
C AURIS DNA BLD POS QL NAA+NON-PROBE: NOT DETECTED
C GATTII+NEOFOR DNA BLD POS QL NAA+N-PRB: NOT DETECTED
C GATTII+NEOFOR DNA BLD POS QL NAA+N-PRB: NOT DETECTED
C GLABRATA DNA BLD POS QL NAA+NON-PROBE: NOT DETECTED
C GLABRATA DNA BLD POS QL NAA+NON-PROBE: NOT DETECTED
C KRUSEI DNA BLD POS QL NAA+NON-PROBE: NOT DETECTED
C KRUSEI DNA BLD POS QL NAA+NON-PROBE: NOT DETECTED
C PARAP DNA BLD POS QL NAA+NON-PROBE: NOT DETECTED
C PARAP DNA BLD POS QL NAA+NON-PROBE: NOT DETECTED
C TROPICLS DNA BLD POS QL NAA+NON-PROBE: NOT DETECTED
C TROPICLS DNA BLD POS QL NAA+NON-PROBE: NOT DETECTED
CALCIUM SERPL-MCNC: 8.1 MG/DL (ref 8.4–10.2)
CALCIUM SERPL-MCNC: 8.8 MG/DL (ref 8.4–10.2)
CHLORIDE SERPL-SCNC: 107 MMOL/L (ref 97–110)
CHLORIDE SERPL-SCNC: 109 MMOL/L (ref 97–110)
CO2 SERPL-SCNC: 26 MMOL/L (ref 21–32)
CO2 SERPL-SCNC: 27 MMOL/L (ref 21–32)
COLOR UR: ABNORMAL
CONDITION: ABNORMAL
CREAT SERPL-MCNC: 1.21 MG/DL (ref 0.51–0.95)
CREAT SERPL-MCNC: 1.49 MG/DL (ref 0.51–0.95)
DEPRECATED RDW RBC: 48.5 FL (ref 39–50)
DEPRECATED RDW RBC: 49.1 FL (ref 39–50)
E CLOAC COMP DNA BLD POS NAA+NON-PROBE: NOT DETECTED
E CLOAC COMP DNA BLD POS NAA+NON-PROBE: NOT DETECTED
E COLI DNA BLD POS QL NAA+NON-PROBE: NOT DETECTED
E COLI DNA BLD POS QL NAA+NON-PROBE: NOT DETECTED
E FAECALIS DNA BLD POS QL NAA+NON-PROBE: NOT DETECTED
E FAECALIS DNA BLD POS QL NAA+NON-PROBE: NOT DETECTED
E FAECIUM DNA BLD POS QL NAA+NON-PROBE: NOT DETECTED
E FAECIUM DNA BLD POS QL NAA+NON-PROBE: NOT DETECTED
E WAVE DECELARATION TIME (MDT): 18.72
EGFRCR SERPLBLD CKD-EPI 2021: 34 ML/MIN/{1.73_M2}
EGFRCR SERPLBLD CKD-EPI 2021: 43 ML/MIN/{1.73_M2}
ENTEROBACTERALES DNA BLD POS NAA+N-PRB: NOT DETECTED
ENTEROBACTERALES DNA BLD POS NAA+N-PRB: NOT DETECTED
EOSINOPHIL # BLD: 0 K/MCL (ref 0–0.5)
EOSINOPHIL NFR BLD: 0 %
ERYTHROCYTE [DISTWIDTH] IN BLOOD: 13.8 % (ref 11–15)
ERYTHROCYTE [DISTWIDTH] IN BLOOD: 13.9 % (ref 11–15)
FASTING DURATION TIME PATIENT: ABNORMAL H
FASTING DURATION TIME PATIENT: ABNORMAL H
FIO2 ON VENT: 50 %
FLUAV RNA RESP QL NAA+PROBE: NOT DETECTED
FLUBV RNA RESP QL NAA+PROBE: NOT DETECTED
GLOBULIN SER-MCNC: 4.9 G/DL (ref 2–4)
GLOBULIN SER-MCNC: 5.9 G/DL (ref 2–4)
GLUCOSE BLDC GLUCOMTR-MCNC: 99 MG/DL (ref 70–99)
GLUCOSE SERPL-MCNC: 110 MG/DL (ref 70–99)
GLUCOSE SERPL-MCNC: 94 MG/DL (ref 70–99)
GLUCOSE UR STRIP-MCNC: NEGATIVE MG/DL
GP B STREP DNA CSF QL NAA+NON-PROBE: NOT DETECTED
GP B STREP DNA CSF QL NAA+NON-PROBE: NOT DETECTED
GRAM STN SPEC: ABNORMAL
HAEM INFLU DNA BLD POS QL NAA+NON-PROBE: NOT DETECTED
HAEM INFLU DNA BLD POS QL NAA+NON-PROBE: NOT DETECTED
HCO3 BLDA-SCNC: 25 MMOL/L (ref 22–28)
HCT VFR BLD CALC: 27.9 % (ref 36–46.5)
HCT VFR BLD CALC: 33.5 % (ref 36–46.5)
HGB BLD-MCNC: 10.8 G/DL (ref 12–15.5)
HGB BLD-MCNC: 9.1 G/DL (ref 12–15.5)
HGB UR QL STRIP: NEGATIVE
HOROWITZ INDEX BLDA+IHG-RTO: 270 MM[HG] (ref 300–500)
HYALINE CASTS #/AREA URNS LPF: ABNORMAL /LPF
IMM GRANULOCYTES # BLD AUTO: 0.7 K/MCL (ref 0–0.2)
IMM GRANULOCYTES # BLD: 2 %
INR PPP: 1.2
INTERVENTRICULAR SEPTUM IN END DIASTOLE (IVSD): 2.04
K OXYTOCA DNA BLD POS QL NAA+NON-PROBE: NOT DETECTED
K OXYTOCA DNA BLD POS QL NAA+NON-PROBE: NOT DETECTED
KETONES UR STRIP-MCNC: NEGATIVE MG/DL
KLEBSIELLA SP DNA BLD POS QL NAA+NON-PRB: NOT DETECTED
L MONOCYTOG DNA BLD POS QL NAA+NON-PROBE: NOT DETECTED
L MONOCYTOG DNA BLD POS QL NAA+NON-PROBE: NOT DETECTED
L PNEUMO1 AG UR QL IA.RAPID: NORMAL
LACTATE BLDV-SCNC: 2.9 MMOL/L (ref 0–2)
LACTATE BLDV-SCNC: 3.7 MMOL/L (ref 0–2)
LEFT INTERNAL DIMENSION IN SYSTOLE (LVSD): 1.1
LEFT VENTRICULAR INTERNAL DIMENSION IN DIASTOLE (LVDD): 3.8
LEFT VENTRICULAR POSTERIOR WALL IN END DIASTOLE (LVPW): 4.6
LEUKOCYTE ESTERASE UR QL STRIP: ABNORMAL
LV EF: NORMAL %
LVOT 2D (LVOTD): 14
LVOT VTI (LVOTVTI): 0.79
LYMPHOCYTES # BLD: 1 K/MCL (ref 1–4)
LYMPHOCYTES NFR BLD: 3 %
MAGNESIUM SERPL-MCNC: 2.3 MG/DL (ref 1.7–2.4)
MAGNESIUM SERPL-MCNC: 2.4 MG/DL (ref 1.7–2.4)
MCH RBC QN AUTO: 31.2 PG (ref 26–34)
MCH RBC QN AUTO: 31.7 PG (ref 26–34)
MCHC RBC AUTO-ENTMCNC: 32.2 G/DL (ref 32–36.5)
MCHC RBC AUTO-ENTMCNC: 32.6 G/DL (ref 32–36.5)
MCV RBC AUTO: 96.8 FL (ref 78–100)
MCV RBC AUTO: 97.2 FL (ref 78–100)
MONOCYTES # BLD: 1.1 K/MCL (ref 0.3–0.9)
MONOCYTES NFR BLD: 3 %
MRSA DNA SPEC QL NAA+PROBE: NOT DETECTED
MUCOUS THREADS URNS QL MICRO: PRESENT
MV E TISSUE VEL MED (MESV): 5.87
MV E WAVE VEL/E TISSUE VEL MED(MSR): 5.77
MV PEAK A VELOCITY (MVPAV): 132
MV PEAK E VELOCITY (MVPEV): 0.5
N MEN DNA BLD POS QL NAA+NON-PROBE: NOT DETECTED
N MEN DNA BLD POS QL NAA+NON-PROBE: NOT DETECTED
NEUTROPHILS # BLD: 35.1 K/MCL (ref 1.8–7.7)
NEUTROPHILS NFR BLD: 92 %
NITRITE UR QL STRIP: NEGATIVE
NRBC BLD MANUAL-RTO: 0 /100 WBC
NRBC BLD MANUAL-RTO: 0 /100 WBC
NT-PROBNP SERPL-MCNC: ABNORMAL PG/ML
P AERUGINOSA DNA BLD POS NAA+NON-PROBE: NOT DETECTED
P AERUGINOSA DNA BLD POS NAA+NON-PROBE: NOT DETECTED
P AXIS (DEGREES): 65
PCO2 BLDA: 35 MM HG (ref 32–45)
PH BLDA: 7.46 UNITS (ref 7.35–7.45)
PH UR STRIP: 5.5 [PH] (ref 5–7)
PHOSPHATE SERPL-MCNC: 5 MG/DL (ref 2.4–4.7)
PLATELET # BLD AUTO: 343 K/MCL (ref 140–450)
PLATELET # BLD AUTO: 382 K/MCL (ref 140–450)
PO2 BLDA: 135 MM HG (ref 83–108)
POTASSIUM SERPL-SCNC: 4.9 MMOL/L (ref 3.4–5.1)
POTASSIUM SERPL-SCNC: 5.1 MMOL/L (ref 3.4–5.1)
PR-INTERVAL (MSEC): 168
PROCALCITONIN SERPL IA-MCNC: 2.86 NG/ML
PROT SERPL-MCNC: 6.5 G/DL (ref 6.4–8.2)
PROT SERPL-MCNC: 7.8 G/DL (ref 6.4–8.2)
PROT UR STRIP-MCNC: 30 MG/DL
PROTEUS SP DNA BLD POS QL NAA+NON-PROBE: NOT DETECTED
PROTEUS SP DNA BLD POS QL NAA+NON-PROBE: NOT DETECTED
PROTHROMBIN TIME: 13 SEC (ref 9.7–11.8)
QRS-INTERVAL (MSEC): 90
QRS-INTERVAL (MSEC): 96
QT-INTERVAL (MSEC): 378
QT-INTERVAL (MSEC): 396
QTC: 468
QTC: 538
R AXIS (DEGREES): -24
R AXIS (DEGREES): -29
RBC # BLD: 2.87 MIL/MCL (ref 4–5.2)
RBC # BLD: 3.46 MIL/MCL (ref 4–5.2)
RBC #/AREA URNS HPF: ABNORMAL /HPF
REPORT TEXT: NORMAL
REPORT TEXT: NORMAL
RSV AG NPH QL IA.RAPID: NOT DETECTED
RV END SYSTOLIC LONGITUDINAL STRAIN FREE WALL (RVGS): 2.1
S LUGDUNENSIS DNA BLD POS QL NAA+NON-PRB: NOT DETECTED
S LUGDUNENSIS DNA BLD POS QL NAA+NON-PRB: NOT DETECTED
S MALTOPHILIA DNA BLD POS QL NAA+NON-PRB: NOT DETECTED
S MALTOPHILIA DNA BLD POS QL NAA+NON-PRB: NOT DETECTED
S MARCESCENS DNA BLD POS NAA+NON-PROBE: NOT DETECTED
S MARCESCENS DNA BLD POS NAA+NON-PROBE: NOT DETECTED
S PNEUM AG UR QL IA.RAPID: NORMAL
S PNEUM DNA BLD POS QL NAA+NON-PROBE: DETECTED
S PNEUM DNA BLD POS QL NAA+NON-PROBE: DETECTED
S PYO DNA BLD POS QL NAA+NON-PROBE: NOT DETECTED
S PYO DNA BLD POS QL NAA+NON-PROBE: NOT DETECTED
SALMONELLA DNA BLD POS QL NAA+NON-PROBE: NOT DETECTED
SALMONELLA DNA BLD POS QL NAA+NON-PROBE: NOT DETECTED
SAO2 % BLDA: 99 % (ref 95–99)
SARS-COV-2 RNA RESP QL NAA+PROBE: NOT DETECTED
SERVICE CMNT-IMP: NORMAL
SERVICE CMNT-IMP: NORMAL
SODIUM SERPL-SCNC: 137 MMOL/L (ref 135–145)
SODIUM SERPL-SCNC: 138 MMOL/L (ref 135–145)
SP GR UR STRIP: 1.02 (ref 1–1.03)
SQUAMOUS #/AREA URNS HPF: ABNORMAL /HPF
STAPHYLOCOCCUS AUREUS: NOT DETECTED
STAPHYLOCOCCUS AUREUS: NOT DETECTED
STAPHYLOCOCCUS SPECIES: NOT DETECTED
STAPHYLOCOCCUS SPECIES: NOT DETECTED
T AXIS (DEGREES): 171
T AXIS (DEGREES): 99
TRICUSPID VALVE PEAK REGURGITATION VELOCITY (TRPV): 2.7
TROPONIN I SERPL DL<=0.01 NG/ML-MCNC: 54 NG/L
TROPONIN I SERPL DL<=0.01 NG/ML-MCNC: 63 NG/L
TSH SERPL-ACNC: 1.76 MCUNITS/ML (ref 0.35–5)
TV ESTIMATED RIGHT ARTERIAL PRESSURE (RAP): 15
UROBILINOGEN UR STRIP-MCNC: 0.2 MG/DL
VENTRICULAR RATE EKG/MIN (BPM): 111
VENTRICULAR RATE EKG/MIN (BPM): 92
WBC # BLD: 38 K/MCL (ref 4.2–11)
WBC # BLD: 41 K/MCL (ref 4.2–11)
WBC #/AREA URNS HPF: ABNORMAL /HPF

## 2024-01-01 PROCEDURE — 71045 X-RAY EXAM CHEST 1 VIEW: CPT

## 2024-01-01 PROCEDURE — 10006031 HB ROOM CHARGE TELEMETRY

## 2024-01-01 PROCEDURE — P9612 CATHETERIZE FOR URINE SPEC: HCPCS

## 2024-01-01 PROCEDURE — 10002800 HB RX 250 W HCPCS: Performed by: HOSPITALIST

## 2024-01-01 PROCEDURE — 85730 THROMBOPLASTIN TIME PARTIAL: CPT | Performed by: INTERNAL MEDICINE

## 2024-01-01 PROCEDURE — 10004651 HB RX, NO CHARGE ITEM: Performed by: EMERGENCY MEDICINE

## 2024-01-01 PROCEDURE — 93005 ELECTROCARDIOGRAM TRACING: CPT | Performed by: EMERGENCY MEDICINE

## 2024-01-01 PROCEDURE — 36415 COLL VENOUS BLD VENIPUNCTURE: CPT | Performed by: EMERGENCY MEDICINE

## 2024-01-01 PROCEDURE — 84484 ASSAY OF TROPONIN QUANT: CPT | Performed by: EMERGENCY MEDICINE

## 2024-01-01 PROCEDURE — 83605 ASSAY OF LACTIC ACID: CPT | Performed by: EMERGENCY MEDICINE

## 2024-01-01 PROCEDURE — 84145 PROCALCITONIN (PCT): CPT | Performed by: EMERGENCY MEDICINE

## 2024-01-01 PROCEDURE — 10002800 HB RX 250 W HCPCS: Performed by: EMERGENCY MEDICINE

## 2024-01-01 PROCEDURE — 87086 URINE CULTURE/COLONY COUNT: CPT | Performed by: EMERGENCY MEDICINE

## 2024-01-01 PROCEDURE — 71275 CT ANGIOGRAPHY CHEST: CPT

## 2024-01-01 PROCEDURE — 0241U COVID/FLU/RSV PANEL: CPT | Performed by: EMERGENCY MEDICINE

## 2024-01-01 PROCEDURE — 81001 URINALYSIS AUTO W/SCOPE: CPT | Performed by: EMERGENCY MEDICINE

## 2024-01-01 PROCEDURE — 82803 BLOOD GASES ANY COMBINATION: CPT

## 2024-01-01 PROCEDURE — 10000002 HB ROOM CHARGE MED SURG

## 2024-01-01 PROCEDURE — 10002800 HB RX 250 W HCPCS: Performed by: INTERNAL MEDICINE

## 2024-01-01 PROCEDURE — 87205 SMEAR GRAM STAIN: CPT | Performed by: INTERNAL MEDICINE

## 2024-01-01 PROCEDURE — 83735 ASSAY OF MAGNESIUM: CPT | Performed by: INTERNAL MEDICINE

## 2024-01-01 PROCEDURE — 99291 CRITICAL CARE FIRST HOUR: CPT

## 2024-01-01 PROCEDURE — 94660 CPAP INITIATION&MGMT: CPT

## 2024-01-01 PROCEDURE — 85730 THROMBOPLASTIN TIME PARTIAL: CPT | Performed by: HOSPITALIST

## 2024-01-01 PROCEDURE — 87899 AGENT NOS ASSAY W/OPTIC: CPT | Performed by: INTERNAL MEDICINE

## 2024-01-01 PROCEDURE — 85027 COMPLETE CBC AUTOMATED: CPT | Performed by: INTERNAL MEDICINE

## 2024-01-01 PROCEDURE — 10000008 HB ROOM CHARGE ICU OR CCU

## 2024-01-01 PROCEDURE — 10002807 HB RX 258: Performed by: INTERNAL MEDICINE

## 2024-01-01 PROCEDURE — 80053 COMPREHEN METABOLIC PANEL: CPT | Performed by: INTERNAL MEDICINE

## 2024-01-01 PROCEDURE — 36415 COLL VENOUS BLD VENIPUNCTURE: CPT | Performed by: INTERNAL MEDICINE

## 2024-01-01 PROCEDURE — 80053 COMPREHEN METABOLIC PANEL: CPT | Performed by: EMERGENCY MEDICINE

## 2024-01-01 PROCEDURE — 93306 TTE W/DOPPLER COMPLETE: CPT

## 2024-01-01 PROCEDURE — 96375 TX/PRO/DX INJ NEW DRUG ADDON: CPT

## 2024-01-01 PROCEDURE — 10002805 HB CONTRAST AGENT: Performed by: EMERGENCY MEDICINE

## 2024-01-01 PROCEDURE — 85025 COMPLETE CBC W/AUTO DIFF WBC: CPT | Performed by: EMERGENCY MEDICINE

## 2024-01-01 PROCEDURE — 10002801 HB RX 250 W/O HCPCS: Performed by: INTERNAL MEDICINE

## 2024-01-01 PROCEDURE — 85610 PROTHROMBIN TIME: CPT | Performed by: EMERGENCY MEDICINE

## 2024-01-01 PROCEDURE — 10002807 HB RX 258: Performed by: EMERGENCY MEDICINE

## 2024-01-01 PROCEDURE — 87040 BLOOD CULTURE FOR BACTERIA: CPT | Performed by: EMERGENCY MEDICINE

## 2024-01-01 PROCEDURE — 10004180 HB COUNTER-TRANSPORT

## 2024-01-01 PROCEDURE — 10002803 HB RX 637: Performed by: INTERNAL MEDICINE

## 2024-01-01 PROCEDURE — 93005 ELECTROCARDIOGRAM TRACING: CPT | Performed by: INTERNAL MEDICINE

## 2024-01-01 PROCEDURE — 93010 ELECTROCARDIOGRAM REPORT: CPT | Performed by: INTERNAL MEDICINE

## 2024-01-01 PROCEDURE — 84100 ASSAY OF PHOSPHORUS: CPT | Performed by: INTERNAL MEDICINE

## 2024-01-01 PROCEDURE — 87641 MR-STAPH DNA AMP PROBE: CPT | Performed by: INTERNAL MEDICINE

## 2024-01-01 PROCEDURE — 36600 WITHDRAWAL OF ARTERIAL BLOOD: CPT

## 2024-01-01 PROCEDURE — 10004651 HB RX, NO CHARGE ITEM: Performed by: HOSPITALIST

## 2024-01-01 PROCEDURE — 10002801 HB RX 250 W/O HCPCS: Performed by: EMERGENCY MEDICINE

## 2024-01-01 PROCEDURE — 84443 ASSAY THYROID STIM HORMONE: CPT | Performed by: EMERGENCY MEDICINE

## 2024-01-01 PROCEDURE — 96374 THER/PROPH/DIAG INJ IV PUSH: CPT

## 2024-01-01 PROCEDURE — 83880 ASSAY OF NATRIURETIC PEPTIDE: CPT | Performed by: EMERGENCY MEDICINE

## 2024-01-01 PROCEDURE — 93970 EXTREMITY STUDY: CPT

## 2024-01-01 PROCEDURE — 10002803 HB RX 637: Performed by: HOSPITALIST

## 2024-01-01 PROCEDURE — 83735 ASSAY OF MAGNESIUM: CPT | Performed by: EMERGENCY MEDICINE

## 2024-01-01 PROCEDURE — 3E043XZ INTRODUCTION OF VASOPRESSOR INTO CENTRAL VEIN, PERCUTANEOUS APPROACH: ICD-10-PCS | Performed by: EMERGENCY MEDICINE

## 2024-01-01 PROCEDURE — 84484 ASSAY OF TROPONIN QUANT: CPT | Performed by: INTERNAL MEDICINE

## 2024-01-01 PROCEDURE — 85730 THROMBOPLASTIN TIME PARTIAL: CPT | Performed by: EMERGENCY MEDICINE

## 2024-01-01 PROCEDURE — 87154 CUL TYP ID BLD PTHGN 6+ TRGT: CPT | Performed by: EMERGENCY MEDICINE

## 2024-01-01 RX ORDER — 0.9 % SODIUM CHLORIDE 0.9 %
2 VIAL (ML) INJECTION EVERY 12 HOURS SCHEDULED
Status: CANCELLED | OUTPATIENT
Start: 2024-01-01

## 2024-01-01 RX ORDER — CEFAZOLIN SODIUM/WATER 2 G/20 ML
2000 SYRINGE (ML) INTRAVENOUS ONCE
Status: COMPLETED | OUTPATIENT
Start: 2024-01-01 | End: 2024-01-01

## 2024-01-01 RX ORDER — PREDNISONE 5 MG/1
5 TABLET ORAL
Status: DISCONTINUED | OUTPATIENT
Start: 2024-01-01 | End: 2024-01-01 | Stop reason: HOSPADM

## 2024-01-01 RX ORDER — PREDNISONE 5 MG/1
5 TABLET ORAL DAILY
Status: DISCONTINUED | OUTPATIENT
Start: 2024-01-01 | End: 2024-01-01

## 2024-01-01 RX ORDER — HALOPERIDOL 5 MG/ML
1 INJECTION INTRAMUSCULAR
Status: DISCONTINUED | OUTPATIENT
Start: 2024-01-01 | End: 2024-01-01 | Stop reason: HOSPADM

## 2024-01-01 RX ORDER — LORAZEPAM 2 MG/ML
0.5 INJECTION INTRAMUSCULAR
Status: DISCONTINUED | OUTPATIENT
Start: 2024-01-01 | End: 2024-01-01 | Stop reason: HOSPADM

## 2024-01-01 RX ORDER — SCOLOPAMINE TRANSDERMAL SYSTEM 1 MG/1
1 PATCH, EXTENDED RELEASE TRANSDERMAL
Status: DISCONTINUED | OUTPATIENT
Start: 2024-01-01 | End: 2024-01-01 | Stop reason: HOSPADM

## 2024-01-01 RX ORDER — LORAZEPAM 0.5 MG/1
0.5 TABLET ORAL
Status: CANCELLED | OUTPATIENT
Start: 2024-01-01

## 2024-01-01 RX ORDER — LORAZEPAM 2 MG/ML
0.5 INJECTION INTRAMUSCULAR
Status: CANCELLED | OUTPATIENT
Start: 2024-01-01

## 2024-01-01 RX ORDER — GLYCOPYRROLATE 0.2 MG/ML
0.2 INJECTION, SOLUTION INTRAMUSCULAR; INTRAVENOUS EVERY 4 HOURS PRN
Status: CANCELLED | OUTPATIENT
Start: 2024-01-01

## 2024-01-01 RX ORDER — NOREPINEPHRINE BITARTRATE/D5W 8 MG/250ML
0-10 PLASTIC BAG, INJECTION (ML) INTRAVENOUS CONTINUOUS
Status: DISCONTINUED | OUTPATIENT
Start: 2024-01-01 | End: 2024-01-01

## 2024-01-01 RX ORDER — LORAZEPAM 0.5 MG/1
0.5 TABLET ORAL
Status: DISCONTINUED | OUTPATIENT
Start: 2024-01-01 | End: 2024-01-01 | Stop reason: HOSPADM

## 2024-01-01 RX ORDER — ONDANSETRON 2 MG/ML
4 INJECTION INTRAMUSCULAR; INTRAVENOUS EVERY 12 HOURS PRN
Status: DISCONTINUED | OUTPATIENT
Start: 2024-01-01 | End: 2024-01-01 | Stop reason: HOSPADM

## 2024-01-01 RX ORDER — LORAZEPAM 2 MG/ML
0.5 INJECTION INTRAMUSCULAR EVERY 6 HOURS
Status: DISCONTINUED | OUTPATIENT
Start: 2024-01-01 | End: 2024-01-01 | Stop reason: HOSPADM

## 2024-01-01 RX ORDER — PREDNISONE 5 MG/1
5 TABLET ORAL DAILY
COMMUNITY

## 2024-01-01 RX ORDER — SULFASALAZINE 500 MG/1
500 TABLET ORAL 2 TIMES DAILY
COMMUNITY
Start: 2024-01-01

## 2024-01-01 RX ORDER — 0.9 % SODIUM CHLORIDE 0.9 %
2 VIAL (ML) INJECTION EVERY 12 HOURS SCHEDULED
Status: DISCONTINUED | OUTPATIENT
Start: 2024-01-01 | End: 2024-01-01 | Stop reason: HOSPADM

## 2024-01-01 RX ORDER — LIDOCAINE HYDROCHLORIDE 20 MG/ML
10 JELLY TOPICAL PRN
Status: CANCELLED | OUTPATIENT
Start: 2024-01-01

## 2024-01-01 RX ORDER — ACETAMINOPHEN 325 MG/1
650 TABLET ORAL ONCE
Status: COMPLETED | OUTPATIENT
Start: 2024-01-01 | End: 2024-01-01

## 2024-01-01 RX ORDER — AMOXICILLIN 250 MG
2 CAPSULE ORAL 2 TIMES DAILY PRN
Status: DISCONTINUED | OUTPATIENT
Start: 2024-01-01 | End: 2024-01-01 | Stop reason: HOSPADM

## 2024-01-01 RX ORDER — HALOPERIDOL 5 MG/ML
1 INJECTION INTRAMUSCULAR
Status: CANCELLED | OUTPATIENT
Start: 2024-01-01

## 2024-01-01 RX ORDER — HEPARIN SODIUM 1000 [USP'U]/ML
40 INJECTION, SOLUTION INTRAVENOUS; SUBCUTANEOUS PRN
Status: DISCONTINUED | OUTPATIENT
Start: 2024-01-01 | End: 2024-01-01

## 2024-01-01 RX ORDER — BISACODYL 10 MG
10 SUPPOSITORY, RECTAL RECTAL DAILY PRN
Status: DISCONTINUED | OUTPATIENT
Start: 2024-01-01 | End: 2024-01-01 | Stop reason: HOSPADM

## 2024-01-01 RX ORDER — ACETAMINOPHEN 325 MG/1
650 TABLET ORAL EVERY 4 HOURS PRN
Status: DISCONTINUED | OUTPATIENT
Start: 2024-01-01 | End: 2024-01-01 | Stop reason: HOSPADM

## 2024-01-01 RX ORDER — HEPARIN SODIUM 10000 [USP'U]/100ML
1-40 INJECTION, SOLUTION INTRAVENOUS CONTINUOUS
Status: DISCONTINUED | OUTPATIENT
Start: 2024-01-01 | End: 2024-01-01

## 2024-01-01 RX ORDER — GLYCOPYRROLATE 0.2 MG/ML
0.2 INJECTION, SOLUTION INTRAMUSCULAR; INTRAVENOUS EVERY 4 HOURS PRN
Status: DISCONTINUED | OUTPATIENT
Start: 2024-01-01 | End: 2024-01-01 | Stop reason: HOSPADM

## 2024-01-01 RX ORDER — FUROSEMIDE 10 MG/ML
40 INJECTION INTRAMUSCULAR; INTRAVENOUS ONCE
Status: COMPLETED | OUTPATIENT
Start: 2024-01-01 | End: 2024-01-01

## 2024-01-01 RX ORDER — SODIUM CHLORIDE 9 MG/ML
INJECTION, SOLUTION INTRAVENOUS CONTINUOUS PRN
Status: DISCONTINUED | OUTPATIENT
Start: 2024-01-01 | End: 2024-01-01

## 2024-01-01 RX ORDER — SODIUM CHLORIDE 9 MG/ML
INJECTION, SOLUTION INTRAVENOUS CONTINUOUS PRN
Status: DISCONTINUED | OUTPATIENT
Start: 2024-01-01 | End: 2024-01-01 | Stop reason: HOSPADM

## 2024-01-01 RX ORDER — MAGNESIUM HYDROXIDE/ALUMINUM HYDROXICE/SIMETHICONE 120; 1200; 1200 MG/30ML; MG/30ML; MG/30ML
30 SUSPENSION ORAL PRN
Status: DISCONTINUED | OUTPATIENT
Start: 2024-01-01 | End: 2024-01-01 | Stop reason: HOSPADM

## 2024-01-01 RX ORDER — SULFASALAZINE 500 MG/1
500 TABLET ORAL
Status: DISCONTINUED | OUTPATIENT
Start: 2024-01-01 | End: 2024-01-01

## 2024-01-01 RX ORDER — CEFAZOLIN SODIUM/WATER 2 G/20 ML
2000 SYRINGE (ML) INTRAVENOUS DAILY
Status: DISCONTINUED | OUTPATIENT
Start: 2024-01-01 | End: 2024-01-01

## 2024-01-01 RX ORDER — ALENDRONATE SODIUM 70 MG/1
70 TABLET ORAL
COMMUNITY

## 2024-01-01 RX ORDER — SERTRALINE HYDROCHLORIDE 100 MG/1
50 TABLET, FILM COATED ORAL DAILY
Status: DISCONTINUED | OUTPATIENT
Start: 2024-01-01 | End: 2024-01-01 | Stop reason: HOSPADM

## 2024-01-01 RX ORDER — SODIUM CHLORIDE 9 MG/ML
INJECTION, SOLUTION INTRAVENOUS CONTINUOUS
Status: ACTIVE | OUTPATIENT
Start: 2024-01-01 | End: 2024-01-01

## 2024-01-01 RX ORDER — HEPARIN SODIUM 1000 [USP'U]/ML
80 INJECTION, SOLUTION INTRAVENOUS; SUBCUTANEOUS PRN
Status: DISCONTINUED | OUTPATIENT
Start: 2024-01-01 | End: 2024-01-01

## 2024-01-01 RX ORDER — CARBOXYMETHYLCELLULOSE SODIUM 5 MG/ML
1 SOLUTION/ DROPS OPHTHALMIC PRN
Status: DISCONTINUED | OUTPATIENT
Start: 2024-01-01 | End: 2024-01-01 | Stop reason: HOSPADM

## 2024-01-01 RX ORDER — CARBOXYMETHYLCELLULOSE SODIUM 5 MG/ML
1 SOLUTION/ DROPS OPHTHALMIC PRN
Status: CANCELLED | OUTPATIENT
Start: 2024-01-01

## 2024-01-01 RX ORDER — BISACODYL 10 MG
10 SUPPOSITORY, RECTAL RECTAL DAILY PRN
Status: CANCELLED | OUTPATIENT
Start: 2024-01-01

## 2024-01-01 RX ORDER — LORAZEPAM 0.5 MG/1
0.5 TABLET ORAL
Status: DISCONTINUED | OUTPATIENT
Start: 2024-01-01 | End: 2024-01-01 | Stop reason: CLARIF

## 2024-01-01 RX ORDER — ONDANSETRON 4 MG/1
4 TABLET, ORALLY DISINTEGRATING ORAL EVERY 12 HOURS PRN
Status: DISCONTINUED | OUTPATIENT
Start: 2024-01-01 | End: 2024-01-01 | Stop reason: HOSPADM

## 2024-01-01 RX ORDER — LIDOCAINE HYDROCHLORIDE 20 MG/ML
10 JELLY TOPICAL PRN
Status: DISCONTINUED | OUTPATIENT
Start: 2024-01-01 | End: 2024-01-01 | Stop reason: HOSPADM

## 2024-01-01 RX ORDER — LANOLIN ALCOHOL/MO/W.PET/CERES
6 CREAM (GRAM) TOPICAL NIGHTLY
Status: DISCONTINUED | OUTPATIENT
Start: 2024-01-01 | End: 2024-01-01 | Stop reason: HOSPADM

## 2024-01-01 RX ORDER — LACTULOSE 10 G/15ML
20 SOLUTION ORAL DAILY PRN
Status: DISCONTINUED | OUTPATIENT
Start: 2024-01-01 | End: 2024-01-01 | Stop reason: HOSPADM

## 2024-01-01 RX ORDER — FUROSEMIDE 10 MG/ML
20 INJECTION INTRAMUSCULAR; INTRAVENOUS ONCE
Status: COMPLETED | OUTPATIENT
Start: 2024-01-01 | End: 2024-01-01

## 2024-01-01 RX ADMIN — HEPARIN SODIUM 3600 UNITS: 1000 INJECTION, SOLUTION INTRAVENOUS; SUBCUTANEOUS at 18:31

## 2024-01-01 RX ADMIN — MORPHINE SULFATE 2 MG: 4 INJECTION INTRAVENOUS at 18:33

## 2024-01-01 RX ADMIN — MORPHINE SULFATE 2 MG: 4 INJECTION INTRAVENOUS at 14:47

## 2024-01-01 RX ADMIN — SODIUM CHLORIDE, PRESERVATIVE FREE 2 ML: 5 INJECTION INTRAVENOUS at 20:50

## 2024-01-01 RX ADMIN — SODIUM CHLORIDE 500 ML: 9 INJECTION, SOLUTION INTRAVENOUS at 14:02

## 2024-01-01 RX ADMIN — HEPARIN SODIUM 1800 UNITS: 1000 INJECTION, SOLUTION INTRAVENOUS; SUBCUTANEOUS at 11:32

## 2024-01-01 RX ADMIN — SERTRALINE HYDROCHLORIDE 50 MG: 100 TABLET, FILM COATED ORAL at 08:26

## 2024-01-01 RX ADMIN — MORPHINE SULFATE 2 MG: 4 INJECTION INTRAVENOUS at 10:48

## 2024-01-01 RX ADMIN — SERTRALINE HYDROCHLORIDE 50 MG: 100 TABLET, FILM COATED ORAL at 08:57

## 2024-01-01 RX ADMIN — CEFTRIAXONE SODIUM 2000 MG: 10 INJECTION, POWDER, FOR SOLUTION INTRAVENOUS at 13:02

## 2024-01-01 RX ADMIN — SODIUM CHLORIDE, PRESERVATIVE FREE 2 ML: 5 INJECTION INTRAVENOUS at 08:11

## 2024-01-01 RX ADMIN — CEFTRIAXONE SODIUM 2000 MG: 10 INJECTION, POWDER, FOR SOLUTION INTRAVENOUS at 08:27

## 2024-01-01 RX ADMIN — HEPARIN SODIUM 3600 UNITS: 1000 INJECTION, SOLUTION INTRAVENOUS; SUBCUTANEOUS at 23:14

## 2024-01-01 RX ADMIN — HYDROCORTISONE SODIUM SUCCINATE 50 MG: 100 INJECTION, POWDER, FOR SOLUTION INTRAMUSCULAR; INTRAVENOUS at 15:07

## 2024-01-01 RX ADMIN — MORPHINE SULFATE 2 MG: 4 INJECTION INTRAVENOUS at 17:59

## 2024-01-01 RX ADMIN — MORPHINE SULFATE 2 MG: 4 INJECTION INTRAVENOUS at 23:37

## 2024-01-01 RX ADMIN — MORPHINE SULFATE 2 MG: 4 INJECTION INTRAVENOUS at 17:56

## 2024-01-01 RX ADMIN — MORPHINE SULFATE 2 MG: 4 INJECTION INTRAVENOUS at 05:28

## 2024-01-01 RX ADMIN — MORPHINE SULFATE 2 MG: 4 INJECTION INTRAVENOUS at 14:34

## 2024-01-01 RX ADMIN — SODIUM CHLORIDE, PRESERVATIVE FREE 2 ML: 5 INJECTION INTRAVENOUS at 08:00

## 2024-01-01 RX ADMIN — HYDROCORTISONE SODIUM SUCCINATE 50 MG: 100 INJECTION, POWDER, FOR SOLUTION INTRAMUSCULAR; INTRAVENOUS at 17:03

## 2024-01-01 RX ADMIN — MORPHINE SULFATE 2 MG: 4 INJECTION INTRAVENOUS at 00:22

## 2024-01-01 RX ADMIN — MORPHINE SULFATE 2 MG: 4 INJECTION INTRAVENOUS at 17:43

## 2024-01-01 RX ADMIN — HYDROCORTISONE SODIUM SUCCINATE 50 MG: 100 INJECTION, POWDER, FOR SOLUTION INTRAMUSCULAR; INTRAVENOUS at 05:11

## 2024-01-01 RX ADMIN — LORAZEPAM 0.5 MG: 2 INJECTION INTRAMUSCULAR; INTRAVENOUS at 17:01

## 2024-01-01 RX ADMIN — SODIUM CHLORIDE, PRESERVATIVE FREE 2 ML: 5 INJECTION INTRAVENOUS at 08:58

## 2024-01-01 RX ADMIN — DOXYCYCLINE 100 MG: 100 INJECTION, POWDER, LYOPHILIZED, FOR SOLUTION INTRAVENOUS at 09:19

## 2024-01-01 RX ADMIN — SCOPALAMINE 1 PATCH: 1 PATCH, EXTENDED RELEASE TRANSDERMAL at 15:07

## 2024-01-01 RX ADMIN — HYDROCORTISONE SODIUM SUCCINATE 50 MG: 100 INJECTION, POWDER, FOR SOLUTION INTRAMUSCULAR; INTRAVENOUS at 23:18

## 2024-01-01 RX ADMIN — SODIUM CHLORIDE, PRESERVATIVE FREE 2 ML: 5 INJECTION INTRAVENOUS at 09:24

## 2024-01-01 RX ADMIN — DOXYCYCLINE 100 MG: 100 INJECTION, POWDER, LYOPHILIZED, FOR SOLUTION INTRAVENOUS at 21:20

## 2024-01-01 RX ADMIN — Medication 6 MG: at 20:38

## 2024-01-01 RX ADMIN — LORAZEPAM 0.5 MG: 2 INJECTION INTRAMUSCULAR; INTRAVENOUS at 09:23

## 2024-01-01 RX ADMIN — HYDROCORTISONE SODIUM SUCCINATE 50 MG: 100 INJECTION, POWDER, FOR SOLUTION INTRAMUSCULAR; INTRAVENOUS at 22:13

## 2024-01-01 RX ADMIN — NOREPINEPHRINE BITARTRATE 5 MCG/MIN: 8 INJECTION, SOLUTION INTRAVENOUS at 14:11

## 2024-01-01 RX ADMIN — CEFTRIAXONE SODIUM 2000 MG: 10 INJECTION, POWDER, FOR SOLUTION INTRAVENOUS at 08:57

## 2024-01-01 RX ADMIN — LORAZEPAM 0.5 MG: 2 INJECTION INTRAMUSCULAR; INTRAVENOUS at 06:05

## 2024-01-01 RX ADMIN — SODIUM CHLORIDE, PRESERVATIVE FREE 2 ML: 5 INJECTION INTRAVENOUS at 20:43

## 2024-01-01 RX ADMIN — SODIUM CHLORIDE: 9 INJECTION, SOLUTION INTRAVENOUS at 18:30

## 2024-01-01 RX ADMIN — LORAZEPAM 0.5 MG: 2 INJECTION INTRAMUSCULAR; INTRAVENOUS at 21:07

## 2024-01-01 RX ADMIN — FUROSEMIDE 20 MG: 10 INJECTION, SOLUTION INTRAMUSCULAR; INTRAVENOUS at 22:10

## 2024-01-01 RX ADMIN — ACETAMINOPHEN 650 MG: 325 TABLET ORAL at 12:52

## 2024-01-01 RX ADMIN — MORPHINE SULFATE 2 MG: 4 INJECTION INTRAVENOUS at 00:07

## 2024-01-01 RX ADMIN — MORPHINE SULFATE 2 MG: 4 INJECTION INTRAVENOUS at 00:59

## 2024-01-01 RX ADMIN — HEPARIN SODIUM AND DEXTROSE 18 UNITS/KG/HR: 10000; 5 INJECTION INTRAVENOUS at 16:50

## 2024-01-01 RX ADMIN — SODIUM CHLORIDE, PRESERVATIVE FREE 2 ML: 5 INJECTION INTRAVENOUS at 16:00

## 2024-01-01 RX ADMIN — HYDROCORTISONE SODIUM SUCCINATE 50 MG: 100 INJECTION, POWDER, FOR SOLUTION INTRAMUSCULAR; INTRAVENOUS at 18:09

## 2024-01-01 RX ADMIN — LORAZEPAM 0.5 MG: 2 INJECTION INTRAMUSCULAR; INTRAVENOUS at 19:15

## 2024-01-01 RX ADMIN — LORAZEPAM 0.5 MG: 2 INJECTION INTRAMUSCULAR; INTRAVENOUS at 15:07

## 2024-01-01 RX ADMIN — IOHEXOL 75 ML: 350 INJECTION, SOLUTION INTRAVENOUS at 14:25

## 2024-01-01 RX ADMIN — BISACODYL 10 MG: 10 SUPPOSITORY RECTAL at 15:26

## 2024-01-01 RX ADMIN — FUROSEMIDE 40 MG: 10 INJECTION, SOLUTION INTRAMUSCULAR; INTRAVENOUS at 12:57

## 2024-01-01 RX ADMIN — HEPARIN SODIUM AND DEXTROSE 28 UNITS/KG/HR: 10000; 5 INJECTION INTRAVENOUS at 18:29

## 2024-01-01 RX ADMIN — SODIUM CHLORIDE, PRESERVATIVE FREE 2 ML: 5 INJECTION INTRAVENOUS at 08:28

## 2024-01-01 RX ADMIN — DOXYCYCLINE 100 MG: 100 INJECTION, POWDER, LYOPHILIZED, FOR SOLUTION INTRAVENOUS at 08:34

## 2024-01-01 RX ADMIN — LORAZEPAM 0.5 MG: 2 INJECTION INTRAMUSCULAR; INTRAVENOUS at 13:33

## 2024-01-01 RX ADMIN — LORAZEPAM 0.5 MG: 2 INJECTION INTRAMUSCULAR; INTRAVENOUS at 09:13

## 2024-01-01 RX ADMIN — SODIUM CHLORIDE, PRESERVATIVE FREE 2 ML: 5 INJECTION INTRAVENOUS at 21:08

## 2024-01-01 RX ADMIN — MORPHINE SULFATE 2 MG: 4 INJECTION INTRAVENOUS at 11:53

## 2024-01-01 RX ADMIN — AZITHROMYCIN DIHYDRATE 500 MG: 500 INJECTION, POWDER, LYOPHILIZED, FOR SOLUTION INTRAVENOUS at 13:06

## 2024-01-01 RX ADMIN — HEPARIN SODIUM 1800 UNITS: 1000 INJECTION, SOLUTION INTRAVENOUS; SUBCUTANEOUS at 05:20

## 2024-01-01 RX ADMIN — HYDROCORTISONE SODIUM SUCCINATE 50 MG: 100 INJECTION, POWDER, FOR SOLUTION INTRAMUSCULAR; INTRAVENOUS at 05:01

## 2024-01-01 SDOH — HEALTH STABILITY: PHYSICAL HEALTH: DO YOU HAVE DIFFICULTY DRESSING OR BATHING?: NO

## 2024-01-01 SDOH — ECONOMIC STABILITY: HOUSING INSECURITY: DO YOU HAVE PROBLEMS WITH ANY OF THE FOLLOWING?: NONE OF THE ABOVE

## 2024-01-01 SDOH — ECONOMIC STABILITY: TRANSPORTATION INSECURITY
IN THE PAST 12 MONTHS, HAS LACK OF RELIABLE TRANSPORTATION KEPT YOU FROM MEDICAL APPOINTMENTS, MEETINGS, WORK OR FROM GETTING THINGS NEEDED FOR DAILY LIVING?: NO

## 2024-01-01 SDOH — ECONOMIC STABILITY: HOUSING INSECURITY: WHAT IS YOUR LIVING SITUATION TODAY?: I HAVE A STEADY PLACE TO LIVE

## 2024-01-01 SDOH — SOCIAL STABILITY: SOCIAL NETWORK
HOW OFTEN DO YOU SEE OR TALK TO PEOPLE THAT YOU CARE ABOUT AND FEEL CLOSE TO? (FOR EXAMPLE: TALKING TO FRIENDS ON THE PHONE, VISITING FRIENDS OR FAMILY, GOING TO CHURCH OR CLUB MEETINGS): 5 OR MORE TIMES A WEEK

## 2024-01-01 SDOH — HEALTH STABILITY: GENERAL: BECAUSE OF A PHYSICAL, MENTAL, OR EMOTIONAL CONDITION, DO YOU HAVE DIFFICULTY DOING ERRANDS ALONE?: NO

## 2024-01-01 SDOH — ECONOMIC STABILITY: GENERAL

## 2024-01-01 SDOH — SOCIAL STABILITY: SOCIAL NETWORK: SUPPORT SYSTEMS: FAMILY MEMBERS;FRIENDS

## 2024-01-01 SDOH — SOCIAL STABILITY: SOCIAL INSECURITY: HOW OFTEN DOES ANYONE, INCLUDING FAMILY AND FRIENDS, INSULT OR TALK DOWN TO YOU?: NEVER

## 2024-01-01 SDOH — ECONOMIC STABILITY: HOUSING INSECURITY: WHAT IS YOUR LIVING SITUATION TODAY?: SUPERVISED LIVING

## 2024-01-01 SDOH — HEALTH STABILITY: PHYSICAL HEALTH: DO YOU HAVE SERIOUS DIFFICULTY WALKING OR CLIMBING STAIRS?: YES

## 2024-01-01 SDOH — HEALTH STABILITY: PHYSICAL HEALTH: DO YOU HAVE SERIOUS DIFFICULTY WALKING OR CLIMBING STAIRS?: NO

## 2024-01-01 SDOH — SOCIAL STABILITY: SOCIAL INSECURITY: HOW OFTEN DOES ANYONE, INCLUDING FAMILY AND FRIENDS, THREATEN YOU WITH HARM?: NEVER

## 2024-01-01 SDOH — HEALTH STABILITY: GENERAL
BECAUSE OF A PHYSICAL, MENTAL, OR EMOTIONAL CONDITION, DO YOU HAVE SERIOUS DIFFICULTY CONCENTRATING, REMEMBERING OR MAKING DECISIONS?: YES

## 2024-01-01 SDOH — ECONOMIC STABILITY: FOOD INSECURITY: WITHIN THE PAST 12 MONTHS, THE FOOD YOU BOUGHT JUST DIDN'T LAST AND YOU DIDN'T HAVE MONEY TO GET MORE.: NEVER TRUE

## 2024-01-01 SDOH — HEALTH STABILITY: GENERAL: BECAUSE OF A PHYSICAL, MENTAL, OR EMOTIONAL CONDITION, DO YOU HAVE DIFFICULTY DOING ERRANDS ALONE?: YES

## 2024-01-01 SDOH — ECONOMIC STABILITY: INCOME INSECURITY: IN THE PAST 12 MONTHS, HAS THE ELECTRIC, GAS, OIL, OR WATER COMPANY THREATENED TO SHUT OFF SERVICE IN YOUR HOME?: NO

## 2024-01-01 SDOH — SOCIAL STABILITY: SOCIAL NETWORK: SUPPORT SYSTEMS: FAMILY MEMBERS;CHILDREN;SIBLINGS

## 2024-01-01 SDOH — ECONOMIC STABILITY: HOUSING INSECURITY: WHAT IS YOUR LIVING SITUATION TODAY?: SUPPORTED INDEPENDENT

## 2024-01-01 SDOH — SOCIAL STABILITY: SOCIAL INSECURITY: HOW OFTEN DOES ANYONE, INCLUDING FAMILY AND FRIENDS, PHYSICALLY HURT YOU?: NEVER

## 2024-01-01 SDOH — HEALTH STABILITY: GENERAL
BECAUSE OF A PHYSICAL, MENTAL, OR EMOTIONAL CONDITION, DO YOU HAVE SERIOUS DIFFICULTY CONCENTRATING, REMEMBERING OR MAKING DECISIONS?: NO

## 2024-01-01 SDOH — ECONOMIC STABILITY: HOUSING INSECURITY: WHAT IS YOUR LIVING SITUATION TODAY?: ASSISTED LIVING

## 2024-01-01 SDOH — SOCIAL STABILITY: SOCIAL INSECURITY: HOW OFTEN DOES ANYONE, INCLUDING FAMILY AND FRIENDS, SCREAM OR CURSE AT YOU?: NEVER

## 2024-01-01 SDOH — SOCIAL STABILITY: SOCIAL INSECURITY: HOW OFTEN DOES ANYONE, INCLUDING FAMILY AND FRIENDS, SCREAM OR CURSE AT YOU?: PATIENT UNABLE TO ANSWER

## 2024-01-01 SDOH — SOCIAL STABILITY: SOCIAL INSECURITY: HOW OFTEN DOES ANYONE, INCLUDING FAMILY AND FRIENDS, INSULT OR TALK DOWN TO YOU?: PATIENT UNABLE TO ANSWER

## 2024-01-01 SDOH — ECONOMIC STABILITY: GENERAL: WOULD YOU LIKE HELP WITH ANY OF THE FOLLOWING NEEDS?: I DON'T WANT HELP WITH ANY OF THESE

## 2024-01-01 SDOH — SOCIAL STABILITY: SOCIAL INSECURITY: HOW OFTEN DOES ANYONE, INCLUDING FAMILY AND FRIENDS, THREATEN YOU WITH HARM?: PATIENT UNABLE TO ANSWER

## 2024-01-01 SDOH — SOCIAL STABILITY: SOCIAL NETWORK
HOW OFTEN DO YOU SEE OR TALK TO PEOPLE THAT YOU CARE ABOUT AND FEEL CLOSE TO? (FOR EXAMPLE: TALKING TO FRIENDS ON THE PHONE, VISITING FRIENDS OR FAMILY, GOING TO CHURCH OR CLUB MEETINGS): 1 OR 2 TIMES A WEEK

## 2024-01-01 SDOH — HEALTH STABILITY: PHYSICAL HEALTH: DO YOU HAVE DIFFICULTY DRESSING OR BATHING?: YES

## 2024-01-01 ASSESSMENT — PAIN SCALES - WONG BAKER
WONGBAKER_NUMERICALRESPONSE: 0
WONGBAKER_NUMERICALRESPONSE: 0
WONGBAKER_NUMERICALRESPONSE: 1
WONGBAKER_NUMERICALRESPONSE: 1
WONGBAKER_NUMERICALRESPONSE: 0
WONGBAKER_NUMERICALRESPONSE: 8
WONGBAKER_NUMERICALRESPONSE: 9
WONGBAKER_NUMERICALRESPONSE: 4

## 2024-01-01 ASSESSMENT — LIFESTYLE VARIABLES
HOW OFTEN DO YOU HAVE 6 OR MORE DRINKS ON ONE OCCASION: NEVER
AUDIT-C TOTAL SCORE: 0
HOW OFTEN DO YOU HAVE 6 OR MORE DRINKS ON ONE OCCASION: NEVER
HOW OFTEN DO YOU HAVE A DRINK CONTAINING ALCOHOL: NEVER
ALCOHOL_USE_STATUS: NO OR LOW RISK WITH VALIDATED TOOL
HOW MANY STANDARD DRINKS CONTAINING ALCOHOL DO YOU HAVE ON A TYPICAL DAY: 0,1 OR 2
HOW MANY STANDARD DRINKS CONTAINING ALCOHOL DO YOU HAVE ON A TYPICAL DAY: 0,1 OR 2
HOW OFTEN DO YOU HAVE A DRINK CONTAINING ALCOHOL: 4 OR MORE TIMES PER WEEK
AUDIT-C TOTAL SCORE: 4
ALCOHOL_USE_STATUS: NO OR LOW RISK WITH VALIDATED TOOL

## 2024-01-01 ASSESSMENT — ENCOUNTER SYMPTOMS
DIZZINESS: 0
SHORTNESS OF BREATH: 1
FATIGUE: 1
VOMITING: 0
FEVER: 0
ABDOMINAL PAIN: 0
DIARRHEA: 0
LIGHT-HEADEDNESS: 0
NAUSEA: 0
CHILLS: 0

## 2024-01-01 ASSESSMENT — COGNITIVE AND FUNCTIONAL STATUS - GENERAL
DO YOU HAVE DIFFICULTY DRESSING OR BATHING: NO
BECAUSE OF A PHYSICAL, MENTAL, OR EMOTIONAL CONDITION, DO YOU HAVE DIFFICULTY DOING ERRANDS ALONE: YES
BECAUSE OF A PHYSICAL, MENTAL, OR EMOTIONAL CONDITION, DO YOU HAVE SERIOUS DIFFICULTY CONCENTRATING, REMEMBERING OR MAKING DECISIONS: NO
DO YOU HAVE SERIOUS DIFFICULTY WALKING OR CLIMBING STAIRS: NO

## 2024-01-01 ASSESSMENT — PATIENT HEALTH QUESTIONNAIRE - PHQ9
CLINICAL INTERPRETATION OF PHQ2 SCORE: NO FURTHER SCREENING NEEDED
IS PATIENT ABLE TO COMPLETE PHQ2 OR PHQ9: YES
IS PATIENT ABLE TO COMPLETE PHQ2 OR PHQ9: YES
1. LITTLE INTEREST OR PLEASURE IN DOING THINGS: SEVERAL DAYS
2. FEELING DOWN, DEPRESSED OR HOPELESS: NOT AT ALL
2. FEELING DOWN, DEPRESSED OR HOPELESS: SEVERAL DAYS
SUM OF ALL RESPONSES TO PHQ9 QUESTIONS 1 AND 2: 0
CLINICAL INTERPRETATION OF PHQ2 SCORE: NO FURTHER SCREENING NEEDED
1. LITTLE INTEREST OR PLEASURE IN DOING THINGS: NOT AT ALL
SUM OF ALL RESPONSES TO PHQ9 QUESTIONS 1 AND 2: 2
SUM OF ALL RESPONSES TO PHQ9 QUESTIONS 1 AND 2: 2
SUM OF ALL RESPONSES TO PHQ9 QUESTIONS 1 AND 2: 0

## 2024-01-01 ASSESSMENT — ORIENTATION MEMORY CONCENTRATION TEST (OMCT)
WHAT TIME IS IT (NO WATCH OR CLOCK): INCORRECT
WHAT MONTH IS IT NOW: CORRECT
COUNT BACKWARDS FROM 20 TO 1: CORRECT
OMCT SCORE: 3
OMCT INTERPRETATION: 0-6: NO SIGNIFICANT IMPAIRMENT
SAY THE MONTHS IN REVERSE ORDER STARTING WITH LAST MONTH: CORRECT
WHAT YEAR IS IT NOW (MUST BE EXACT): CORRECT
REPEAT THE NAME AND ADDRESS I ASKED YOU TO REMEMBER: CORRECT

## 2024-01-01 ASSESSMENT — PAIN SCALES - GENERAL
PAINLEVEL_OUTOF10: 0
PAINLEVEL_OUTOF10: 0
PAINLEVEL_OUTOF10: 8
PAINLEVEL_OUTOF10: 0

## 2024-01-01 ASSESSMENT — COLUMBIA-SUICIDE SEVERITY RATING SCALE - C-SSRS
6. HAVE YOU EVER DONE ANYTHING, STARTED TO DO ANYTHING, OR PREPARED TO DO ANYTHING TO END YOUR LIFE?: NO
1. WITHIN THE PAST MONTH, HAVE YOU WISHED YOU WERE DEAD OR WISHED YOU COULD GO TO SLEEP AND NOT WAKE UP?: NO
2. HAVE YOU ACTUALLY HAD ANY THOUGHTS OF KILLING YOURSELF?: NO
6. HAVE YOU EVER DONE ANYTHING, STARTED TO DO ANYTHING, OR PREPARED TO DO ANYTHING TO END YOUR LIFE?: NO
1. WITHIN THE PAST MONTH, HAVE YOU WISHED YOU WERE DEAD OR WISHED YOU COULD GO TO SLEEP AND NOT WAKE UP?: NO
IS THE PATIENT ABLE TO COMPLETE C-SSRS: YES
2. HAVE YOU ACTUALLY HAD ANY THOUGHTS OF KILLING YOURSELF?: NO
IS THE PATIENT ABLE TO COMPLETE C-SSRS: YES

## 2024-01-01 ASSESSMENT — ACTIVITIES OF DAILY LIVING (ADL)
ADL_BEFORE_ADMISSION: INDEPENDENT
RECENT_DECLINE_ADL: NO
ADL_BEFORE_ADMISSION: NEEDS/REQUIRES ASSISTANCE
ADL_SCORE: 12
FEEDING: DEPENDENT
TOILETING: DEPENDENT
RECENT_DECLINE_ADL: NO
ADL_SCORE: 0
ADL_SHORT_OF_BREATH: YES
ADL_SHORT_OF_BREATH: NO
BATHING: DEPENDENT
DRESSING: DEPENDENT

## 2024-02-20 RX ORDER — SULFASALAZINE 500 MG/1
TABLET ORAL
Qty: 540 TABLET | Refills: 1 | Status: SHIPPED | OUTPATIENT
Start: 2024-02-20

## 2024-02-20 NOTE — TELEPHONE ENCOUNTER
Requested Prescriptions     Pending Prescriptions Disp Refills    sulfaSALAzine 500 MG Oral Tab 540 tablet 1     Sig: TAKE 3 TABLETS BY MOUTH TWICE DAILY     No future appointments.  LOV: 9/7/23  Last Refilled:9/7/23 #540 1RF    ASSESSMENT AND PLAN:      Seropositive RA  - Diagnosed in 2016 and was following Dr. Post that time  - Currently on sulfasalazine 1500 mg twice a day and prednisone 4 mg daily  - Joints are currently stable  - Blood work every 3 months     Positive LISA  - In the past she was found to have a positive LISA, SSA, SSB and double-stranded DNA.  Currently no symptoms of lupus or Sjogren's.  We will continue to monitor  - Recent creatinine normal, recent urinalysis showed 1+ protein and no blood     Osteopenia  - Bone density done 8/2023 showing lumbar spine T score -1.7, femoral neck -0.5 and left total hip -1.4  - On Fosamax 70 mg weekly, also takes calcium 600 mg daily and vitamin D 2000 units daily     Thank you for allowing me to participate in this patients care. Pt will f/u in 3-4 mos     Ana Coronel MD  9/7/2023  11:04 AM

## 2024-02-25 PROBLEM — J96.91 HYPOXIC RESPIRATORY FAILURE (CMD): Status: ACTIVE | Noted: 2024-01-01

## (undated) NOTE — Clinical Note
March 2, 2017         Berry Bruce  [address] [address]      Patient: Sis De Guzman   YOB: 1935   Date of Visit: 3/1/2017       HPI:    Patient ID: Sis De Guzman is a 81year old fema left upper lobe. Additional nodularity and pleural thickening posterior aspect of the superior segments of the lower lobes bilaterally. Infiltration of the anterior superior mediastinum. A follow-up CT is recommended in 3 months.   There are changes of a ASSESSMENT/PLAN:     1. Severe CCP and RF positive rheumatoid arthritis. She has improved with weekly methotrexate. She will remain off prednisone. She will increase her methotrexate to 15 mg weekly. She will follow-up in 5 weeks.    2. Small pulmonary nodu

## (undated) NOTE — Clinical Note
May 24, 2017         Soila Cheshire, [place] [address] [address]      Patient: León Cruz   YOB: 1935   Date of Visit: 5/23/2017       Dear Dr. Nash Espinoza,    I saw your patient, León Cruz, claudication, sore throat. 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 Chest CT scan was done on January 16th of 2017.   She has hyperinflation greatly on weekly methotrexate. There is continued activity reflected in her labs. She will now increase her methotrexate to 15 mg weekly. She refuses to go back on prednisone 5 mg every morning.   She will return in 6 weeks after repeat monitoring tests

## (undated) NOTE — MR AVS SNAPSHOT
[facility]  [address] [place] [address], [address] [place] [address]               Thank you for choosing us for your health care visit with Casper Martínez MD.  We are glad to serve you and happy to provide you with this summary Follow-up Instructions     Return in about 5 weeks (around 3/1/2017). [facility]     Sign up for [email or web address], your secure online medical record.   [facility] will allow you to access patient instructions from your recent visit,  view other health information,

## (undated) NOTE — LETTER
July 12, 2017         Berry Chou  [address] [address]      Patient: Eduar Fritz   YOB: 1935   Date of Visit: 7/11/2017       Dear Dr. Manoj Ponce,    I saw your patient, Eduar Fritz having headache, jaw or tongue claudication, sore throat. She saw an eye specialist at [facility] on March 15th of 2017. Chest CT scan was done on January 16th of 2017.   She has hyperinflation with moderate diffuse COPD, apical and upper Constitutional: She is oriented to person, place, and time. She appears well-developed. Thin appearing. HENT:   Head: Normocephalic. Eyes: Conjunctivae are normal.   Cardiovascular: Normal rate, regular rhythm and normal heart sounds.     Pulmonary/Ch

## (undated) NOTE — MR AVS SNAPSHOT
[facility]  [address] [place] [address], [address] [place] [address]               Thank you for choosing us for your health care visit with Hay Samaniego MD.  We are glad to serve you and happy to provide you with this summary Hours:  24-hours Phone:  [phone]    - folic acid 1 MG Tabs            [employer]     Sign up for [employer], your secure online medical record.   [employer] will allow you to access patient instructions from your recent visit,  view other health information, a

## (undated) NOTE — Clinical Note
January 26, 2017         Chichi Turcios, [address]      Patient: Cedric Neal   YOB: 1935   Date of Visit: 1/25/2017       Dear Dr. Seth Hinkle,    HPI:    Patient ID: Sara Brine Infiltration of the anterior superior mediastinum. A follow-up CT is recommended in 3 months. There are changes of acid reflux in the distal esophagus. Her joints have improved. They are better in the morning, with less swelling and stiffness.       R weekly methotrexate. She will remain off prednisone since she stopped it. When her stomach issues are resolved she will restart methotrexate at the higher dose of 15 mg weekly. She will follow-up in 5 weeks.    2. Small pulmonary nodules, pleural thicken

## (undated) NOTE — MR AVS SNAPSHOT
[facility]  [address] [place] [address], [address] [place] [address]               Thank you for choosing us for your health care visit with Roberto Mackey MD.  We are glad to serve you and happy to provide you with this summary visit,  view other health information, and more. To sign up or find more information, go to [email or web address] and click on the Sign Up Now link in the [employer] box.      Enter your [employer] Activation Code exactly as it appears below along with yo